# Patient Record
Sex: MALE | Race: WHITE | Employment: FULL TIME | URBAN - METROPOLITAN AREA
[De-identification: names, ages, dates, MRNs, and addresses within clinical notes are randomized per-mention and may not be internally consistent; named-entity substitution may affect disease eponyms.]

---

## 2017-01-23 ENCOUNTER — OFFICE VISIT (OUTPATIENT)
Dept: FAMILY MEDICINE CLINIC | Facility: CLINIC | Age: 62
End: 2017-01-23

## 2017-01-23 VITALS
RESPIRATION RATE: 18 BRPM | HEIGHT: 66 IN | DIASTOLIC BLOOD PRESSURE: 86 MMHG | SYSTOLIC BLOOD PRESSURE: 142 MMHG | HEART RATE: 80 BPM | BODY MASS INDEX: 32.02 KG/M2 | OXYGEN SATURATION: 99 % | WEIGHT: 199.25 LBS | TEMPERATURE: 99 F

## 2017-01-23 DIAGNOSIS — G47.00 INSOMNIA, UNSPECIFIED TYPE: ICD-10-CM

## 2017-01-23 DIAGNOSIS — G89.29 CHRONIC RIGHT SHOULDER PAIN: Primary | ICD-10-CM

## 2017-01-23 DIAGNOSIS — M25.511 CHRONIC RIGHT SHOULDER PAIN: Primary | ICD-10-CM

## 2017-01-23 DIAGNOSIS — M24.111: ICD-10-CM

## 2017-01-23 DIAGNOSIS — I10 ESSENTIAL HYPERTENSION, BENIGN: ICD-10-CM

## 2017-01-23 PROCEDURE — 99213 OFFICE O/P EST LOW 20 MIN: CPT | Performed by: FAMILY MEDICINE

## 2017-01-23 NOTE — PROGRESS NOTES
Arnulfo Hernandez is a 57year old male.   Patient presents with:  Shoulder Pain: Go over shoulder pain issues  Referral    HPI:   Patient complaining of chronic right shoulder pain and pain in the right scapular area, states was in an accident a few years right, no tenderness to palpation along the joint line, + scapular grinding and popping with ROM, full ROM but has discomfort with shoulder extension, crossarm and abduction, negative impingement.   EXTREMITIES: no cyanosis, clubbing or edema    ASSESSMENT

## 2017-01-23 NOTE — PATIENT INSTRUCTIONS
Please restart amlodipine 1/2 tablet daily. Monitor blood pressure at home. Please take Melatonin 4mg and increase by 1 mg as needed, take 1/2 hour before bedtime, if insomnia is not getting better in 1 week, please follow up.     Controlling High Blood · When you can, walk or bike instead of driving. · [place] leaves, garden, or do household repairs.   · Be active at a moderate to vigorous level of physical activity for at least 40 minutes for a minimum of 3 to 4 days a week.   Manage stress  · Make time to · Exercise regularly. It may help you reduce stress. Avoid strenuous exercise for 2 to 4 hours before bedtime. · Avoid or limit naps, especially in the late afternoon. · Use your bed only for sleep and sex.   · Don’t spend too much time in bed trying to f

## 2017-03-22 ENCOUNTER — TELEPHONE (OUTPATIENT)
Dept: FAMILY MEDICINE CLINIC | Facility: CLINIC | Age: 62
End: 2017-03-22

## 2017-03-22 DIAGNOSIS — M25.511 CHRONIC RIGHT SHOULDER PAIN: ICD-10-CM

## 2017-03-22 DIAGNOSIS — M24.111: ICD-10-CM

## 2017-03-22 DIAGNOSIS — G89.29 CHRONIC RIGHT SHOULDER PAIN: ICD-10-CM

## 2017-03-22 NOTE — TELEPHONE ENCOUNTER
Patient states Dr Dago Garcia referred him to PT back in January. He called to schedule and they say the referral is outdated. He needs an updated referral.  Please notify patient when updated.

## 2017-03-22 NOTE — TELEPHONE ENCOUNTER
The hospital only allows for 30 days to pass without an initial appt    Ok for new referral or needs repeat evaluation

## 2017-04-07 ENCOUNTER — OFFICE VISIT (OUTPATIENT)
Dept: PHYSICAL THERAPY | Age: 62
End: 2017-04-07
Attending: FAMILY MEDICINE
Payer: COMMERCIAL

## 2017-04-07 DIAGNOSIS — M24.111: ICD-10-CM

## 2017-04-07 DIAGNOSIS — G89.29 CHRONIC RIGHT SHOULDER PAIN: Primary | ICD-10-CM

## 2017-04-07 DIAGNOSIS — M25.511 CHRONIC RIGHT SHOULDER PAIN: Primary | ICD-10-CM

## 2017-04-07 PROCEDURE — 97161 PT EVAL LOW COMPLEX 20 MIN: CPT

## 2017-04-07 PROCEDURE — 97112 NEUROMUSCULAR REEDUCATION: CPT

## 2017-04-07 NOTE — PROGRESS NOTES
UPPER EXTREMITY EVALUATION:   Referring Physician: Dr. Unruly Fry  Diagnosis: Chronic right shoulder pain, snapping scapula    Date of Service: 4/7/2017     PATIENT SUMMARY   Christophe Barrera is a 57year old y/o male who presents to therapy today with co upper cervical muscles    Strength/MMT:  Shoulder Scapular   Flexion: R 5/5; L 5/5  Abduction: R 4/5; L 4/5  ER: R 4/5; L 4/5  IR: R 4/5; L 4/5 Rhomboids: R 3-/5, L 4/5  Mid trap: R 3-/5; L 4/5   Lats: R 4/5, L 4/5  Low trap: R 2/5; L 3/5     Special tests

## 2017-04-14 ENCOUNTER — OFFICE VISIT (OUTPATIENT)
Dept: PHYSICAL THERAPY | Age: 62
End: 2017-04-14
Attending: FAMILY MEDICINE
Payer: COMMERCIAL

## 2017-04-14 PROCEDURE — 97112 NEUROMUSCULAR REEDUCATION: CPT

## 2017-04-14 PROCEDURE — 97110 THERAPEUTIC EXERCISES: CPT

## 2017-04-14 NOTE — PROGRESS NOTES
Dx: Chronic right shoulder pain, snapping scapula        Authorized # of Visits:  8         Next MD visit: none scheduled  Fall Risk: standard         Precautions: n/a             Subjective: No significant change this week, exercises are fine.     Objectiv

## 2017-04-21 ENCOUNTER — OFFICE VISIT (OUTPATIENT)
Dept: PHYSICAL THERAPY | Age: 62
End: 2017-04-21
Attending: FAMILY MEDICINE
Payer: COMMERCIAL

## 2017-04-21 PROCEDURE — 97112 NEUROMUSCULAR REEDUCATION: CPT

## 2017-04-21 PROCEDURE — 97110 THERAPEUTIC EXERCISES: CPT

## 2017-04-21 NOTE — PROGRESS NOTES
Dx: Chronic right shoulder pain, snapping scapula        Authorized # of Visits:  8         Next MD visit: none scheduled  Fall Risk: standard         Precautions: n/a             Subjective: No significant change this week, exercises are fine.     Objectiv each        Seated pt education on correct posture 5 mins Supine passive stretch R shoulder flex, abd, add, ER, IR 5 mins        Supine cervical distraction, sub-occipital stretch 2 mins Standing green t band rows high and middle anchor point x 20 each

## 2017-04-28 ENCOUNTER — APPOINTMENT (OUTPATIENT)
Dept: PHYSICAL THERAPY | Age: 62
End: 2017-04-28
Attending: FAMILY MEDICINE
Payer: COMMERCIAL

## 2017-05-05 ENCOUNTER — APPOINTMENT (OUTPATIENT)
Dept: PHYSICAL THERAPY | Age: 62
End: 2017-05-05
Attending: FAMILY MEDICINE
Payer: COMMERCIAL

## 2017-05-12 ENCOUNTER — OFFICE VISIT (OUTPATIENT)
Dept: PHYSICAL THERAPY | Age: 62
End: 2017-05-12
Attending: FAMILY MEDICINE
Payer: COMMERCIAL

## 2017-05-12 PROCEDURE — 97112 NEUROMUSCULAR REEDUCATION: CPT

## 2017-05-12 PROCEDURE — 97140 MANUAL THERAPY 1/> REGIONS: CPT

## 2017-05-12 PROCEDURE — 97110 THERAPEUTIC EXERCISES: CPT

## 2017-05-12 NOTE — PROGRESS NOTES
Dx: Chronic right shoulder pain, snapping scapula        Authorized # of Visits:  8         Next MD visit: none scheduled  Fall Risk: standard         Precautions: n/a             Subjective: Pain in R scapula ranges from 0-4/10.  Pain R upper arm ranges fr Supine serratus press with 2# wts R x 20  R sh flexion x 20     Supine serratus press R x 20 Supine serratus press R with 2# wt 2 x 10 with PT assist for correct technique Supine GH joint mobs Gd 3 2 x 30 secs post glide, inf glide     Prone scap squeeze x

## 2017-05-19 ENCOUNTER — APPOINTMENT (OUTPATIENT)
Dept: PHYSICAL THERAPY | Age: 62
End: 2017-05-19
Attending: FAMILY MEDICINE
Payer: COMMERCIAL

## 2017-05-26 ENCOUNTER — OFFICE VISIT (OUTPATIENT)
Dept: PHYSICAL THERAPY | Age: 62
End: 2017-05-26
Attending: FAMILY MEDICINE
Payer: COMMERCIAL

## 2017-05-26 PROCEDURE — 97140 MANUAL THERAPY 1/> REGIONS: CPT

## 2017-05-26 PROCEDURE — 97110 THERAPEUTIC EXERCISES: CPT

## 2017-05-26 NOTE — PROGRESS NOTES
Dx: Chronic right shoulder pain, snapping scapula        Authorized # of Visits:  8         Next MD visit: none scheduled  Fall Risk: standard         Precautions: n/a             Subjective: Pain in R scapula ranges from 0-4/10.                       Pain 30 sec hold x 3 each   Side lying with PT assist for correct motion R scapular elevation/depression,protraction/retraction Supine DNF strengthening head nod with lift 5 sec hold x 10 Supine serratus press with 2# wts R x 20  R sh flexion x 20 Review of HEP

## 2017-06-02 ENCOUNTER — APPOINTMENT (OUTPATIENT)
Dept: PHYSICAL THERAPY | Age: 62
End: 2017-06-02
Attending: FAMILY MEDICINE
Payer: COMMERCIAL

## 2017-06-09 ENCOUNTER — APPOINTMENT (OUTPATIENT)
Dept: PHYSICAL THERAPY | Age: 62
End: 2017-06-09
Attending: FAMILY MEDICINE
Payer: COMMERCIAL

## 2018-08-24 ENCOUNTER — TELEPHONE (OUTPATIENT)
Dept: FAMILY MEDICINE CLINIC | Facility: CLINIC | Age: 63
End: 2018-08-24

## 2018-10-01 ENCOUNTER — PATIENT MESSAGE (OUTPATIENT)
Dept: FAMILY MEDICINE CLINIC | Facility: CLINIC | Age: 63
End: 2018-10-01

## 2018-10-01 ENCOUNTER — OFFICE VISIT (OUTPATIENT)
Dept: FAMILY MEDICINE CLINIC | Facility: CLINIC | Age: 63
End: 2018-10-01
Payer: COMMERCIAL

## 2018-10-01 VITALS
OXYGEN SATURATION: 97 % | HEART RATE: 82 BPM | WEIGHT: 194 LBS | BODY MASS INDEX: 31.18 KG/M2 | DIASTOLIC BLOOD PRESSURE: 80 MMHG | SYSTOLIC BLOOD PRESSURE: 132 MMHG | TEMPERATURE: 98 F | HEIGHT: 66 IN | RESPIRATION RATE: 16 BRPM

## 2018-10-01 DIAGNOSIS — Z23 NEED FOR VACCINATION: ICD-10-CM

## 2018-10-01 DIAGNOSIS — R01.1 HEART MURMUR: ICD-10-CM

## 2018-10-01 DIAGNOSIS — Z12.5 SCREENING FOR PROSTATE CANCER: ICD-10-CM

## 2018-10-01 DIAGNOSIS — Z00.00 ROUTINE GENERAL MEDICAL EXAMINATION AT A HEALTH CARE FACILITY: Primary | ICD-10-CM

## 2018-10-01 PROCEDURE — 90471 IMMUNIZATION ADMIN: CPT | Performed by: FAMILY MEDICINE

## 2018-10-01 PROCEDURE — 90686 IIV4 VACC NO PRSV 0.5 ML IM: CPT | Performed by: FAMILY MEDICINE

## 2018-10-01 PROCEDURE — 99396 PREV VISIT EST AGE 40-64: CPT | Performed by: FAMILY MEDICINE

## 2018-10-01 NOTE — TELEPHONE ENCOUNTER
Patient also left voicemail with this question, called him back and informed him fasting is only 12 hours. He verbalized understanding.

## 2018-10-01 NOTE — PROGRESS NOTES
Glory Manzo is a 58year old male here for Patient presents with: Well Adult: Physical.    HPI:   Patient is sen for annual physical  Colonoscopy is up to date and has to repeat next year as he had a colon polyp.   Has no complaints or concerns this Comment: 2 times per month      Sexual activity: Yes        Partners: Female      Social History Narrative      Likes sweets and salty snacks , eats fast food at work, eats healthier at home    REVIEW OF SYSTEMS:   Constitutional: no change in weight or ap atraumatic   Eyes:    PERRL, conjunctiva/corneas clear, EOM's intact        Ears:    normal   Nose:  normal   Throat:   Lips, mucosa, and tongue normal; teeth and gums normal   Neck:   Supple, symmetrical, trachea midline, no adenopathy;        thyroid:  N

## 2019-04-27 PROBLEM — D12.3 BENIGN NEOPLASM OF TRANSVERSE COLON: Status: ACTIVE | Noted: 2019-04-27

## 2019-04-27 PROBLEM — Z86.0100 PERSONAL HISTORY OF COLONIC POLYPS: Status: ACTIVE | Noted: 2019-04-27

## 2019-04-27 PROBLEM — Z86.010 PERSONAL HISTORY OF COLONIC POLYPS: Status: ACTIVE | Noted: 2019-04-27

## 2019-07-19 ENCOUNTER — PATIENT MESSAGE (OUTPATIENT)
Dept: FAMILY MEDICINE CLINIC | Facility: CLINIC | Age: 64
End: 2019-07-19

## 2019-07-19 NOTE — TELEPHONE ENCOUNTER
From: Delfina Montenegro  To: Hernesto Trejo MD  Sent: 7/19/2019 2:26 PM CDT  Subject: Other    Hi there,     One other question: My new insurance enrollment form is asking for my \"Primary Care Office ID Number\". I'm assuming that's a real thing?

## 2019-08-16 ENCOUNTER — OFFICE VISIT (OUTPATIENT)
Dept: FAMILY MEDICINE CLINIC | Facility: CLINIC | Age: 64
End: 2019-08-16
Payer: COMMERCIAL

## 2019-08-16 VITALS
DIASTOLIC BLOOD PRESSURE: 98 MMHG | HEIGHT: 66 IN | OXYGEN SATURATION: 98 % | BODY MASS INDEX: 31.72 KG/M2 | SYSTOLIC BLOOD PRESSURE: 144 MMHG | HEART RATE: 72 BPM | TEMPERATURE: 98 F | RESPIRATION RATE: 20 BRPM | WEIGHT: 197.38 LBS

## 2019-08-16 DIAGNOSIS — Z12.5 SCREENING FOR PROSTATE CANCER: ICD-10-CM

## 2019-08-16 DIAGNOSIS — I10 ESSENTIAL HYPERTENSION, BENIGN: ICD-10-CM

## 2019-08-16 DIAGNOSIS — Z00.00 ROUTINE GENERAL MEDICAL EXAMINATION AT A HEALTH CARE FACILITY: Primary | ICD-10-CM

## 2019-08-16 DIAGNOSIS — Z23 NEED FOR VACCINATION: ICD-10-CM

## 2019-08-16 DIAGNOSIS — Z13.1 SCREENING FOR DIABETES MELLITUS: ICD-10-CM

## 2019-08-16 DIAGNOSIS — E66.9 OBESITY (BMI 30.0-34.9): ICD-10-CM

## 2019-08-16 PROBLEM — E66.811 OBESITY (BMI 30.0-34.9): Status: ACTIVE | Noted: 2019-08-16

## 2019-08-16 PROCEDURE — 90670 PCV13 VACCINE IM: CPT | Performed by: FAMILY MEDICINE

## 2019-08-16 PROCEDURE — 90471 IMMUNIZATION ADMIN: CPT | Performed by: FAMILY MEDICINE

## 2019-08-16 PROCEDURE — 99397 PER PM REEVAL EST PAT 65+ YR: CPT | Performed by: FAMILY MEDICINE

## 2019-08-16 PROCEDURE — 99213 OFFICE O/P EST LOW 20 MIN: CPT | Performed by: FAMILY MEDICINE

## 2019-08-16 RX ORDER — LOSARTAN POTASSIUM 25 MG/1
TABLET ORAL
Qty: 90 TABLET | Refills: 0 | OUTPATIENT
Start: 2019-08-16

## 2019-08-16 RX ORDER — LOSARTAN POTASSIUM 25 MG/1
25 TABLET ORAL DAILY
Qty: 30 TABLET | Refills: 0 | Status: SHIPPED | OUTPATIENT
Start: 2019-08-16 | End: 2019-09-20

## 2019-08-16 RX ORDER — LATANOPROST 50 UG/ML
0.01 SOLUTION/ DROPS OPHTHALMIC NIGHTLY
Refills: 5 | COMMUNITY
Start: 2019-06-19

## 2019-08-16 NOTE — PATIENT INSTRUCTIONS
Controlling High Blood Pressure  High blood pressure (hypertension) is often called the silent killer. This is because many people who have it don’t know it. High blood pressure can raise your risk of heart attack, stroke, and heart failure.  Controlling yo provider what weight range is healthiest for you. If you are overweight, a weight loss of only 3% to 5% of your body weight can help lower blood pressure. Generally, a good weight loss goal is to lose 10% of your body weight in a year.   · Limit snacks and vaccines are an important part of managing your health. A screening test is done to find possible disorders or diseases in people who don't have any symptoms.  The goal is to find a disease early so lifestyle changes can be made and you can be watched more c cholesterol or triglycerides All men in this age group At least every 5 years   HIV Men at increased risk for infection – talk with your healthcare provider At routine exams   Lung cancer Adults ages 54 to [de-identified] who have smoked Yearly screening in smokers wit 1 dose of each vaccine   Tetanus/diphtheria/  pertussis (Td/Tdap) booster All men in this age group Td every 10 years, or Tdap if you will have contact with a child younger than 13 months old   Zoster All men in this age group 1 dose   Counseling Who needs

## 2019-08-16 NOTE — PROGRESS NOTES
Allen Gómez is a 71year old male here for Patient presents with: Well Adult: Physical.    HPI:   Patient is sen for annual physical  Colonoscopy is up to date and has to repeat in 3 years as he had mor polyps  Needs biometric form filled for work. Cancer Maternal Grandfather         He was 80   • Heart Attack Brother      SOCIAL HISTORY:     Social History    Socioeconomic History      Marital status:       Spouse name: Not on file      Number of children: 2      Occupational History      Oc difficulty with urine stream.  Rheumatologic: no joint pain, swelling, stiffness. No myalgias. No history of autoimmune disorder. Derm/Skin: No rash or atypical skin lesions.   Neuro: No headache, transient vision disturbances, tremor, difficulty with coor health care facility  -     CBC WITH DIFFERENTIAL WITH PLATELET; Future  -     COMP METABOLIC PANEL (14); Future  -     LIPID PANEL;  Future  -     TSH W REFLEX TO FREE T4; Future  -     URINALYSIS, ROUTINE; Future  -     CBC WITH DIFFERENTIAL WITH PLATELET

## 2019-08-16 NOTE — TELEPHONE ENCOUNTER
Name from pharmacy: LOSARTAN 25MG TABLETS          Will file in chart as: LOSARTAN POTASSIUM 25 MG Oral Tab    Sig: TAKE 1 TABLET(25 MG) BY MOUTH DAILY    Disp:  90 tablet    Refills:  0    Start: 8/16/2019    Class: Normal    For: Essential hypertension,

## 2019-08-19 ENCOUNTER — PATIENT MESSAGE (OUTPATIENT)
Dept: FAMILY MEDICINE CLINIC | Facility: CLINIC | Age: 64
End: 2019-08-19

## 2019-08-19 NOTE — TELEPHONE ENCOUNTER
From: Miranda Montenegro  To:  Maria Elena Knight MD  Sent: 8/19/2019 9:41 AM CDT  Subject: Non-Urgent Medical Question    Good morning,     I scheduled my lab tests at [facility] for Wednesday morning at 8:15 AM. Since I did it on-line I wasn't able to ask them

## 2019-08-22 LAB
ABSOLUTE BASOPHILS: 33 CELLS/UL (ref 0–200)
ABSOLUTE EOSINOPHILS: 91 CELLS/UL (ref 15–500)
ABSOLUTE LYMPHOCYTES: 1486 CELLS/UL (ref 850–3900)
ABSOLUTE MONOCYTES: 349 CELLS/UL (ref 200–950)
ABSOLUTE NEUTROPHILS: 6341 CELLS/UL (ref 1500–7800)
ALBUMIN/GLOBULIN RATIO: 2 (CALC) (ref 1–2.5)
ALBUMIN: 4.7 G/DL (ref 3.6–5.1)
ALKALINE PHOSPHATASE: 53 U/L (ref 40–115)
ALT: 22 U/L (ref 9–46)
APPEARANCE: CLEAR
AST: 22 U/L (ref 10–35)
BASOPHILS: 0.4 %
BILIRUBIN, TOTAL: 0.8 MG/DL (ref 0.2–1.2)
BILIRUBIN: NEGATIVE
BUN: 19 MG/DL (ref 7–25)
CALCIUM: 10 MG/DL (ref 8.6–10.3)
CARBON DIOXIDE: 28 MMOL/L (ref 20–32)
CHLORIDE: 103 MMOL/L (ref 98–110)
CHOL/HDLC RATIO: 3 (CALC)
CHOLESTEROL, TOTAL: 168 MG/DL
COLOR: YELLOW
CREATININE: 1.02 MG/DL (ref 0.7–1.25)
EGFR IF AFRICN AM: 89 ML/MIN/1.73M2
EGFR IF NONAFRICN AM: 77 ML/MIN/1.73M2
EOSINOPHILS: 1.1 %
GLOBULIN: 2.3 G/DL (CALC) (ref 1.9–3.7)
GLUCOSE: 79 MG/DL (ref 65–99)
GLUCOSE: NEGATIVE
HDL CHOLESTEROL: 56 MG/DL
HEMATOCRIT: 45.3 % (ref 38.5–50)
HEMOGLOBIN A1C: 5.2 % OF TOTAL HGB
HEMOGLOBIN: 15.5 G/DL (ref 13.2–17.1)
KETONES: NEGATIVE
LDL-CHOLESTEROL: 92 MG/DL (CALC)
LEUKOCYTE ESTERASE: NEGATIVE
LYMPHOCYTES: 17.9 %
MCH: 30.5 PG (ref 27–33)
MCHC: 34.2 G/DL (ref 32–36)
MCV: 89 FL (ref 80–100)
MONOCYTES: 4.2 %
MPV: 9.2 FL (ref 7.5–12.5)
NEUTROPHILS: 76.4 %
NITRITE: NEGATIVE
NON-HDL CHOLESTEROL: 112 MG/DL (CALC)
OCCULT BLOOD: NEGATIVE
PH: 5.5 (ref 5–8)
PLATELET COUNT: 265 THOUSAND/UL (ref 140–400)
POTASSIUM: 4.7 MMOL/L (ref 3.5–5.3)
PROTEIN, TOTAL: 7 G/DL (ref 6.1–8.1)
PROTEIN: NEGATIVE
PSA, TOTAL: 1.5 NG/ML
RDW: 12.7 % (ref 11–15)
RED BLOOD CELL COUNT: 5.09 MILLION/UL (ref 4.2–5.8)
SODIUM: 140 MMOL/L (ref 135–146)
SPECIFIC GRAVITY: 1.01 (ref 1–1.03)
TRIGLYCERIDES: 105 MG/DL
TSH W/REFLEX TO FT4: 1.53 MIU/L (ref 0.4–4.5)
WHITE BLOOD CELL COUNT: 8.3 THOUSAND/UL (ref 3.8–10.8)

## 2019-08-28 ENCOUNTER — PATIENT MESSAGE (OUTPATIENT)
Dept: FAMILY MEDICINE CLINIC | Facility: CLINIC | Age: 64
End: 2019-08-28

## 2019-08-28 NOTE — TELEPHONE ENCOUNTER
From: Raymond Montenegro  To:  Thai Le MD  Sent: 8/28/2019 8:07 AM CDT  Subject: Non-Urgent Medical Question    Good morning,    I completed all my tests for my physical. Please send in my employers confirmatiion for same (left it with you when I

## 2019-08-28 NOTE — TELEPHONE ENCOUNTER
Checked folder, no bio form for this pt    Sent [facility] message to see if he can refax to us

## 2019-08-28 NOTE — TELEPHONE ENCOUNTER
Regine kept all patient forms in a folder on her desk, if It is there patient has to  the form as we cannot fax it to his personal fax.

## 2019-09-20 ENCOUNTER — OFFICE VISIT (OUTPATIENT)
Dept: FAMILY MEDICINE CLINIC | Facility: CLINIC | Age: 64
End: 2019-09-20
Payer: COMMERCIAL

## 2019-09-20 VITALS
BODY MASS INDEX: 31.82 KG/M2 | SYSTOLIC BLOOD PRESSURE: 122 MMHG | DIASTOLIC BLOOD PRESSURE: 88 MMHG | RESPIRATION RATE: 18 BRPM | HEIGHT: 66 IN | OXYGEN SATURATION: 98 % | HEART RATE: 76 BPM | TEMPERATURE: 98 F | WEIGHT: 198 LBS

## 2019-09-20 DIAGNOSIS — I10 ESSENTIAL HYPERTENSION, BENIGN: ICD-10-CM

## 2019-09-20 PROCEDURE — 99213 OFFICE O/P EST LOW 20 MIN: CPT | Performed by: FAMILY MEDICINE

## 2019-09-20 RX ORDER — LOSARTAN POTASSIUM 25 MG/1
25 TABLET ORAL DAILY
Qty: 90 TABLET | Refills: 1 | Status: SHIPPED | OUTPATIENT
Start: 2019-09-20 | End: 2019-12-19

## 2019-09-20 NOTE — PROGRESS NOTES
Raymond Haywood is a 71year old male.   Patient presents with:  Hypertension: f/u    HPI:   HTN: Patient is seen for follow-up and medication refill, states is tolerating medication well, did forget to take his medication a couple of days but now has a denies shortness of breath with exertion  CARDIOVASCULAR: denies chest pain on exertion  GI: denies abdominal pain and denies heartburn  NEURO: denies headaches    EXAM:   /88   Pulse 76   Temp 98.4 °F (36.9 °C) (Oral)   Resp 18   Ht 66\"   Wt 198 lb

## 2019-09-20 NOTE — PATIENT INSTRUCTIONS
Controlling High Blood Pressure  High blood pressure (hypertension) is often called the silent killer. This is because many people who have it don’t know it. High blood pressure can raise your risk of heart attack, stroke, and heart failure.  Controlling yo · Ask your healthcare provider what weight range is healthiest for you. If you are overweight, a weight loss of only 3% to 5% of your body weight can help lower blood pressure.  Generally, a good weight loss goal is to lose 10% of your body weight in a year

## 2020-03-01 ENCOUNTER — HOSPITAL ENCOUNTER (OUTPATIENT)
Age: 65
Discharge: ANOTHER HOSPITAL | End: 2020-03-01
Attending: EMERGENCY MEDICINE
Payer: COMMERCIAL

## 2020-03-01 ENCOUNTER — HOSPITAL ENCOUNTER (EMERGENCY)
Facility: HOSPITAL | Age: 65
Discharge: HOME | End: 2020-03-01
Attending: EMERGENCY MEDICINE
Payer: COMMERCIAL

## 2020-03-01 VITALS
OXYGEN SATURATION: 99 % | SYSTOLIC BLOOD PRESSURE: 146 MMHG | DIASTOLIC BLOOD PRESSURE: 90 MMHG | BODY MASS INDEX: 31.5 KG/M2 | TEMPERATURE: 98 F | HEART RATE: 80 BPM | WEIGHT: 196 LBS | HEIGHT: 66 IN | RESPIRATION RATE: 18 BRPM

## 2020-03-01 VITALS
HEIGHT: 66 IN | WEIGHT: 196 LBS | TEMPERATURE: 98 F | HEART RATE: 68 BPM | OXYGEN SATURATION: 100 % | DIASTOLIC BLOOD PRESSURE: 98 MMHG | SYSTOLIC BLOOD PRESSURE: 145 MMHG | RESPIRATION RATE: 14 BRPM | BODY MASS INDEX: 31.5 KG/M2

## 2020-03-01 DIAGNOSIS — R03.0 ELEVATED BLOOD PRESSURE READING: ICD-10-CM

## 2020-03-01 DIAGNOSIS — E86.0 DEHYDRATION: ICD-10-CM

## 2020-03-01 DIAGNOSIS — R42 DIZZINESS: Primary | ICD-10-CM

## 2020-03-01 LAB
ATRIAL RATE: 77 BPM
GLUCOSE BLD-MCNC: 101 MG/DL (ref 70–99)
P AXIS: 33 DEGREES
P-R INTERVAL: 170 MS
POCT INFLUENZA A: NEGATIVE
POCT INFLUENZA B: NEGATIVE
Q-T INTERVAL: 396 MS
QRS DURATION: 76 MS
QTC CALCULATION (BEZET): 448 MS
R AXIS: 50 DEGREES
T AXIS: 49 DEGREES
VENTRICULAR RATE: 77 BPM

## 2020-03-01 PROCEDURE — 93010 ELECTROCARDIOGRAM REPORT: CPT | Performed by: INTERNAL MEDICINE

## 2020-03-01 PROCEDURE — 93005 ELECTROCARDIOGRAM TRACING: CPT

## 2020-03-01 PROCEDURE — 99282 EMERGENCY DEPT VISIT SF MDM: CPT

## 2020-03-01 PROCEDURE — 87502 INFLUENZA DNA AMP PROBE: CPT | Performed by: EMERGENCY MEDICINE

## 2020-03-01 PROCEDURE — 93010 ELECTROCARDIOGRAM REPORT: CPT

## 2020-03-01 PROCEDURE — 82962 GLUCOSE BLOOD TEST: CPT

## 2020-03-01 PROCEDURE — 99214 OFFICE O/P EST MOD 30 MIN: CPT

## 2020-03-01 RX ORDER — LOSARTAN POTASSIUM 25 MG/1
TABLET ORAL DAILY
COMMUNITY
End: 2020-03-18

## 2020-03-01 NOTE — ED PROVIDER NOTES
Patient Seen in: [facility]      History   Patient presents with:  Dizziness    Stated Complaint: dizzyness and high bp  x 2 days     HPI    This is a 14-year-old male who presents with complaints of dizziness for the last Smokeless tobacco: Never Used      Tobacco comment: quit in 1986    Alcohol use:  Yes      Alcohol/week: 3.0 standard drinks      Types: 2 Cans of beer, 1 Shots of liquor per week      Comment: Occasional glass of wine    Drug use: Yes      Frequency: 1.0 t Course     Labs Reviewed   POCT GLUCOSE - Abnormal; Notable for the following components:       Result Value    POC Glucose 101 (*)     All other components within normal limits   POCT FLU TEST - Normal    Narrative:      This assay is a rapid molecular in

## 2020-03-01 NOTE — ED INITIAL ASSESSMENT (HPI)
Pt c/o dizziness x 2 days. Body aches x 5-6 days, diarrhea- watery x 2 days - now resolved. Pt states he does feel anxious. Pt returned from a 4 days trip to [place] 1 week ago.  Denies cough,vomiting, sore throat, nasal congestion, runny nose or known fever

## 2020-03-01 NOTE — ED INITIAL ASSESSMENT (HPI)
Pt sent to ED from immediate care for evaluation of feeling dizzy and lightheaded. Pt reports feeling generally not well all last week. Pt reports feeling lightheaded last night, woke up this morning with increased symptoms and feeling anxious.

## 2020-03-18 RX ORDER — LOSARTAN POTASSIUM 25 MG/1
TABLET ORAL
Qty: 30 TABLET | Refills: 0 | Status: SHIPPED | OUTPATIENT
Start: 2020-03-18 | End: 2020-04-02 | Stop reason: DRUGHIGH

## 2020-03-18 RX ORDER — LOSARTAN POTASSIUM 25 MG/1
TABLET ORAL
Qty: 90 TABLET | Refills: 0 | OUTPATIENT
Start: 2020-03-18

## 2020-03-18 NOTE — TELEPHONE ENCOUNTER
LOV 9/19 Return in about 6 months (around 3/20/2020)      LAST LAB 8/19    LAST RX   LOSARTAN 25MG TABLETS 12/20/2019 09/20/2019  90 each          Next OV 4/17/2020      PROTOCOL  Hypertension Medications Protocol Passed    Was in ER Disposition:  Saundra Obregon

## 2020-03-18 NOTE — TELEPHONE ENCOUNTER
LOV 9/20/2019    LAST LAB 8-12-19    LAST RX     Next OV   Future Appointments   Date Time Provider Cinda Cruz   4/17/2020  9:40 AM Camilo Messina MD [facility]         PROTOCOL    Name from pharmacy: LOSARTAN 25MG [address]

## 2020-04-01 ENCOUNTER — PATIENT MESSAGE (OUTPATIENT)
Dept: FAMILY MEDICINE CLINIC | Facility: CLINIC | Age: 65
End: 2020-04-01

## 2020-04-02 RX ORDER — LOSARTAN POTASSIUM 50 MG/1
50 TABLET ORAL DAILY
Qty: 30 TABLET | Refills: 1 | Status: SHIPPED | OUTPATIENT
Start: 2020-04-02 | End: 2020-05-05

## 2020-04-02 RX ORDER — LOSARTAN POTASSIUM 50 MG/1
TABLET ORAL
Qty: 90 TABLET | Refills: 0 | OUTPATIENT
Start: 2020-04-02

## 2020-04-02 NOTE — TELEPHONE ENCOUNTER
LOV 9/20/2019    LAST LAB 8/21/2019    LAST RX   losartan Potassium 50 MG Oral Tab 30 tablet 1 4/2/2020 5/2/2020   Sig:   Take 1 tablet (50 mg total) by mouth daily.            Next OV   Future Appointments   Date Time Provider Cinda Cruz   4/17/2020

## 2020-05-05 ENCOUNTER — TELEMEDICINE (OUTPATIENT)
Dept: FAMILY MEDICINE CLINIC | Facility: CLINIC | Age: 65
End: 2020-05-05

## 2020-05-05 VITALS
DIASTOLIC BLOOD PRESSURE: 85 MMHG | BODY MASS INDEX: 31 KG/M2 | SYSTOLIC BLOOD PRESSURE: 131 MMHG | TEMPERATURE: 98 F | WEIGHT: 192 LBS

## 2020-05-05 DIAGNOSIS — I10 ESSENTIAL HYPERTENSION, BENIGN: Primary | ICD-10-CM

## 2020-05-05 PROCEDURE — 99213 OFFICE O/P EST LOW 20 MIN: CPT | Performed by: FAMILY MEDICINE

## 2020-05-05 RX ORDER — LOSARTAN POTASSIUM 50 MG/1
50 TABLET ORAL DAILY
Qty: 90 TABLET | Refills: 0 | Status: SHIPPED | OUTPATIENT
Start: 2020-05-05 | End: 2020-08-06

## 2020-05-05 NOTE — PROGRESS NOTES
Glory Manzo is a 76year old male. Patient presents with:  Hypertension: medication f/u    HPI:   Glory Manzo is a 76year old male with history of hypertension see via video visit for follow up and medication refill.  Patient states is retir pain on exertion  GI: denies abdominal pain and denies heartburn  NEURO: denies headaches    EXAM:   /85   Temp 97.6 °F (36.4 °C) (Oral)   Wt 192 lb (87.1 kg)   BMI 30.99 kg/m²   GENERAL: well developed, well nourished,in no apparent distress  NECK:

## 2020-08-05 ENCOUNTER — PATIENT MESSAGE (OUTPATIENT)
Dept: FAMILY MEDICINE CLINIC | Facility: CLINIC | Age: 65
End: 2020-08-05

## 2020-08-05 DIAGNOSIS — I10 ESSENTIAL HYPERTENSION, BENIGN: ICD-10-CM

## 2020-08-06 ENCOUNTER — PATIENT MESSAGE (OUTPATIENT)
Dept: FAMILY MEDICINE CLINIC | Facility: CLINIC | Age: 65
End: 2020-08-06

## 2020-08-06 RX ORDER — LOSARTAN POTASSIUM 50 MG/1
50 TABLET ORAL DAILY
Qty: 90 TABLET | Refills: 0 | Status: SHIPPED | OUTPATIENT
Start: 2020-08-06 | End: 2020-11-04

## 2020-08-06 NOTE — TELEPHONE ENCOUNTER
From: Perri Ogden  To: Tenzin Gilmore MD  Sent: 8/5/2020 8:46 AM CDT  Subject: Prescription Question    Hi. My Losartan prescription is going to run out in 5 days and as I recall it is not refillable.  I had a video visit in May but if I'm require

## 2020-08-06 NOTE — TELEPHONE ENCOUNTER
LOV: 20  Phone visit 20  Last refill:  losartan Potassium 50 MG Oral Tab () 90 tablet 0 2020 8/3/2020   Sig:   Take 1 tablet (50 mg total) by mouth daily. Last labs: 19  Future appt: None    rx pended for your approval if ok.

## 2020-08-06 NOTE — TELEPHONE ENCOUNTER
Refilled #90, please schedule a 3 month follow up appointment, patient needs to recheck labs before his appointment.

## 2020-08-06 NOTE — TELEPHONE ENCOUNTER
From: Jere Orozco  To: Matilde Funk MD  Sent: 8/6/2020 6:30 AM CDT  Subject: Non-Urgent Medical Question    Good morning.  Posted this yesterday morning as a \"prescription question\" but didn't hear back so I thought I'd try it again as a non-u

## 2020-08-20 ENCOUNTER — TELEMEDICINE (OUTPATIENT)
Dept: FAMILY MEDICINE CLINIC | Facility: CLINIC | Age: 65
End: 2020-08-20

## 2020-08-20 DIAGNOSIS — Z20.822 CLOSE EXPOSURE TO COVID-19 VIRUS: Primary | ICD-10-CM

## 2020-08-20 PROCEDURE — 99213 OFFICE O/P EST LOW 20 MIN: CPT | Performed by: FAMILY MEDICINE

## 2020-08-20 NOTE — PROGRESS NOTES
Allen Gómez is a 76year old male. No chief complaint on file. This visit is conducted using telemedicine with live interactive video and audio. Moni Carlton verbally consents to virtual video visit.    Patient understands and accepts [address] glass of wine    Drug use: Yes      Frequency: 1.0 times per week      Types: Cannabis      Comment: Occasional marijuana       REVIEW OF SYSTEMS:   GENERAL HEALTH: feels well otherwise  SKIN: denies any unusual skin lesions or rashes  RESPIRATORY: denies

## 2020-08-20 NOTE — PATIENT INSTRUCTIONS
Coronavirus Disease 2019 (COVID-19)     [facility] [address] is committed to the safety and well-being of our patients, members, employees, and communities.  As concerns arise about the new strain of coronavirus that causes COVID-19, [facility] [address] your household, like dishes, towels, and bedding   10. Clean all surfaces that are touched often, like counters, tabletops, and doorknobs. Use household cleaning sprays or wipes according to the label instructions.       Patients with confirmed COVID-19 ioana symptoms started, OR until 72 hours after your fever is gone and symptoms are getting better, whichever is longer.     Additional Information      You can also get more information at the following websites:   Centers for Disease Control & Prevention (CDC)

## 2020-09-08 ENCOUNTER — OFFICE VISIT (OUTPATIENT)
Dept: FAMILY MEDICINE CLINIC | Facility: CLINIC | Age: 65
End: 2020-09-08
Payer: MEDICARE

## 2020-09-08 ENCOUNTER — PATIENT MESSAGE (OUTPATIENT)
Dept: FAMILY MEDICINE CLINIC | Facility: CLINIC | Age: 65
End: 2020-09-08

## 2020-09-08 VITALS
HEART RATE: 81 BPM | DIASTOLIC BLOOD PRESSURE: 84 MMHG | SYSTOLIC BLOOD PRESSURE: 130 MMHG | BODY MASS INDEX: 28.49 KG/M2 | RESPIRATION RATE: 18 BRPM | TEMPERATURE: 98 F | OXYGEN SATURATION: 98 % | WEIGHT: 188 LBS | HEIGHT: 68 IN

## 2020-09-08 DIAGNOSIS — Z23 NEED FOR VACCINATION: ICD-10-CM

## 2020-09-08 DIAGNOSIS — I10 ESSENTIAL HYPERTENSION, BENIGN: ICD-10-CM

## 2020-09-08 DIAGNOSIS — Z13.6 SCREENING FOR CARDIOVASCULAR CONDITION: ICD-10-CM

## 2020-09-08 DIAGNOSIS — Z00.00 ENCOUNTER FOR ANNUAL HEALTH EXAMINATION: Primary | ICD-10-CM

## 2020-09-08 DIAGNOSIS — Z23 FLU VACCINE NEED: ICD-10-CM

## 2020-09-08 DIAGNOSIS — Z12.5 SCREENING FOR PROSTATE CANCER: ICD-10-CM

## 2020-09-08 PROCEDURE — G0102 PROSTATE CA SCREENING; DRE: HCPCS | Performed by: FAMILY MEDICINE

## 2020-09-08 PROCEDURE — G0009 ADMIN PNEUMOCOCCAL VACCINE: HCPCS | Performed by: FAMILY MEDICINE

## 2020-09-08 PROCEDURE — 90732 PPSV23 VACC 2 YRS+ SUBQ/IM: CPT | Performed by: FAMILY MEDICINE

## 2020-09-08 PROCEDURE — G0402 INITIAL PREVENTIVE EXAM: HCPCS | Performed by: FAMILY MEDICINE

## 2020-09-08 PROCEDURE — 90662 IIV NO PRSV INCREASED AG IM: CPT | Performed by: FAMILY MEDICINE

## 2020-09-08 PROCEDURE — G0008 ADMIN INFLUENZA VIRUS VAC: HCPCS | Performed by: FAMILY MEDICINE

## 2020-09-08 NOTE — PATIENT INSTRUCTIONS
Delfina Montenegro's SCREENING SCHEDULE   Tests on this list are recommended by your physician but may not be covered, or covered at this frequency, by your insurer. Please check with your insurance carrier before scheduling to verify coverage.     PREVEN Screen   Covered every 10 years- more often if abnormal Colonoscopy due on 04/27/2022 Update Health Maintenance if applicable    Flex Sigmoidoscopy Screen  Covered every 5 years No results found for this or any previous visit. No flowsheet data found. TOXOIDS AND ACELLULAR PERTUSIS VACCINE (TDAP), >7 YEARS, IM USE    This may be covered with your prescription benefits, but Medicare does not cover unless Medically needed    Zoster (Not covered by Medicare Part B) No orders found for this or any previous

## 2020-09-09 NOTE — TELEPHONE ENCOUNTER
From: Allen Gómez  To: Nancy Hwang MD  Sent: 9/8/2020 11:20 AM CDT  Subject: Non-Urgent Medical Question    Hi there. Just scheduled my labs online. I believe I have to fast prior. ..right? 12 hours?

## 2020-09-10 ENCOUNTER — LAB ENCOUNTER (OUTPATIENT)
Dept: LAB | Age: 65
End: 2020-09-10
Attending: FAMILY MEDICINE
Payer: MEDICARE

## 2020-09-10 DIAGNOSIS — I10 ESSENTIAL HYPERTENSION, BENIGN: ICD-10-CM

## 2020-09-10 DIAGNOSIS — Z12.5 SCREENING FOR PROSTATE CANCER: ICD-10-CM

## 2020-09-10 DIAGNOSIS — Z13.6 SCREENING FOR CARDIOVASCULAR CONDITION: ICD-10-CM

## 2020-09-10 LAB
ALBUMIN SERPL-MCNC: 3.8 G/DL (ref 3.4–5)
ALBUMIN/GLOB SERPL: 1.1 {RATIO} (ref 1–2)
ALP LIVER SERPL-CCNC: 61 U/L (ref 45–117)
ALT SERPL-CCNC: 29 U/L (ref 16–61)
ANION GAP SERPL CALC-SCNC: 4 MMOL/L (ref 0–18)
AST SERPL-CCNC: 19 U/L (ref 15–37)
BILIRUB SERPL-MCNC: 0.6 MG/DL (ref 0.1–2)
BUN BLD-MCNC: 16 MG/DL (ref 7–18)
BUN/CREAT SERPL: 15.7 (ref 10–20)
CALCIUM BLD-MCNC: 9 MG/DL (ref 8.5–10.1)
CHLORIDE SERPL-SCNC: 107 MMOL/L (ref 98–112)
CHOLEST SMN-MCNC: 150 MG/DL (ref ?–200)
CO2 SERPL-SCNC: 28 MMOL/L (ref 21–32)
COMPLEXED PSA SERPL-MCNC: 2.12 NG/ML (ref ?–4)
CREAT BLD-MCNC: 1.02 MG/DL (ref 0.7–1.3)
GLOBULIN PLAS-MCNC: 3.5 G/DL (ref 2.8–4.4)
GLUCOSE BLD-MCNC: 87 MG/DL (ref 70–99)
HDLC SERPL-MCNC: 60 MG/DL (ref 40–59)
LDLC SERPL CALC-MCNC: 75 MG/DL (ref ?–100)
M PROTEIN MFR SERPL ELPH: 7.3 G/DL (ref 6.4–8.2)
NONHDLC SERPL-MCNC: 90 MG/DL (ref ?–130)
OSMOLALITY SERPL CALC.SUM OF ELEC: 289 MOSM/KG (ref 275–295)
PATIENT FASTING Y/N/NP: YES
PATIENT FASTING Y/N/NP: YES
POTASSIUM SERPL-SCNC: 4 MMOL/L (ref 3.5–5.1)
SODIUM SERPL-SCNC: 139 MMOL/L (ref 136–145)
TRIGL SERPL-MCNC: 74 MG/DL (ref 30–149)
VLDLC SERPL CALC-MCNC: 15 MG/DL (ref 0–30)

## 2020-09-10 PROCEDURE — 80061 LIPID PANEL: CPT

## 2020-09-10 PROCEDURE — 36415 COLL VENOUS BLD VENIPUNCTURE: CPT

## 2020-09-10 PROCEDURE — 80053 COMPREHEN METABOLIC PANEL: CPT

## 2020-09-17 ENCOUNTER — PATIENT MESSAGE (OUTPATIENT)
Dept: FAMILY MEDICINE CLINIC | Facility: CLINIC | Age: 65
End: 2020-09-17

## 2020-09-17 NOTE — TELEPHONE ENCOUNTER
From: Saúl Police  To: Luís Vargas MD  Sent: 9/17/2020 11:00 AM CDT  Subject: Non-Urgent Medical Question    Hi,    At my physical last week I did mention to Dr. Cortes Smith that lately I sometimes get dizzy if I stand up too fast...mostly from a

## 2020-12-09 ENCOUNTER — PATIENT MESSAGE (OUTPATIENT)
Dept: FAMILY MEDICINE CLINIC | Facility: CLINIC | Age: 65
End: 2020-12-09

## 2020-12-09 NOTE — TELEPHONE ENCOUNTER
From: Raymond Haywood  To: Ryder Gamble MD  Sent: 12/9/2020 1:34 PM CST  Subject: Non-Urgent Medical Question    Hi there,     My prescription for Losartan is about 10 days from running out.  It would have run out sooner, but the prescription was fo

## 2020-12-10 RX ORDER — LOSARTAN POTASSIUM 25 MG/1
25 TABLET ORAL DAILY
Qty: 90 TABLET | Refills: 0 | Status: SHIPPED | OUTPATIENT
Start: 2020-12-10 | End: 2021-03-12

## 2021-03-12 ENCOUNTER — OFFICE VISIT (OUTPATIENT)
Dept: FAMILY MEDICINE CLINIC | Facility: CLINIC | Age: 66
End: 2021-03-12
Payer: MEDICARE

## 2021-03-12 VITALS
WEIGHT: 186 LBS | HEART RATE: 93 BPM | RESPIRATION RATE: 18 BRPM | DIASTOLIC BLOOD PRESSURE: 94 MMHG | HEIGHT: 68 IN | OXYGEN SATURATION: 98 % | TEMPERATURE: 98 F | BODY MASS INDEX: 28.19 KG/M2 | SYSTOLIC BLOOD PRESSURE: 138 MMHG

## 2021-03-12 DIAGNOSIS — I10 ESSENTIAL HYPERTENSION, BENIGN: Primary | ICD-10-CM

## 2021-03-12 DIAGNOSIS — R01.1 HEART MURMUR: ICD-10-CM

## 2021-03-12 PROBLEM — E66.9 OBESITY (BMI 30.0-34.9): Status: RESOLVED | Noted: 2019-08-16 | Resolved: 2021-03-12

## 2021-03-12 PROBLEM — E66.811 OBESITY (BMI 30.0-34.9): Status: RESOLVED | Noted: 2019-08-16 | Resolved: 2021-03-12

## 2021-03-12 PROCEDURE — 99213 OFFICE O/P EST LOW 20 MIN: CPT | Performed by: FAMILY MEDICINE

## 2021-03-12 RX ORDER — LOSARTAN POTASSIUM 25 MG/1
25 TABLET ORAL DAILY
Qty: 90 TABLET | Refills: 1 | Status: SHIPPED | OUTPATIENT
Start: 2021-03-12 | End: 2021-06-10

## 2021-03-12 RX ORDER — HYDROCHLOROTHIAZIDE 12.5 MG/1
12.5 TABLET ORAL DAILY
Qty: 90 TABLET | Refills: 1 | Status: SHIPPED | OUTPATIENT
Start: 2021-03-12 | End: 2021-06-09 | Stop reason: ALTCHOICE

## 2021-03-12 NOTE — PROGRESS NOTES
Constance Godwin is a 76year old male. Patient presents with:  Hypertension    HPI:   Constance Godwin is a 76year old male with history of hypertension, seen for follow up and medication refill.  Compliant with medication and low salt diet, states B shortness of breath with exertion  CARDIOVASCULAR: denies chest pain on exertion  GI: denies abdominal pain  NEURO: denies headaches    EXAM:   BP (!) 138/94   Pulse 93   Temp 98 °F (36.7 °C) (Temporal)   Resp 18   Ht 5' 8\" (1.727 m)   Wt 186 lb (84.4 kg)

## 2021-03-12 NOTE — PATIENT INSTRUCTIONS
Controlling High Blood Pressure  High blood pressure (hypertension) is often called the silent killer. This is because many people who have it, don’t know it. It can be very dangerous.  High blood pressure can raise your risk of heart attack, stroke, hear your meal be prepared with no added salt. Stay at a healthy weight  · Ask your healthcare provider how many calories to eat a day. Then stick to that number. · Ask your healthcare provider what weight range is healthiest for you.  If you are overweight, information is not intended as a substitute for professional medical care. Always follow your healthcare professional's instructions.

## 2021-03-17 ENCOUNTER — HOSPITAL ENCOUNTER (OUTPATIENT)
Dept: CV DIAGNOSTICS | Facility: HOSPITAL | Age: 66
Discharge: HOME | End: 2021-03-17
Attending: FAMILY MEDICINE
Payer: MEDICARE

## 2021-03-17 DIAGNOSIS — R01.1 HEART MURMUR: ICD-10-CM

## 2021-03-17 PROCEDURE — 93306 TTE W/DOPPLER COMPLETE: CPT | Performed by: FAMILY MEDICINE

## 2021-04-12 ENCOUNTER — OFFICE VISIT (OUTPATIENT)
Dept: FAMILY MEDICINE CLINIC | Facility: CLINIC | Age: 66
End: 2021-04-12
Payer: MEDICARE

## 2021-04-12 VITALS
BODY MASS INDEX: 28.34 KG/M2 | SYSTOLIC BLOOD PRESSURE: 134 MMHG | HEIGHT: 68 IN | HEART RATE: 72 BPM | DIASTOLIC BLOOD PRESSURE: 84 MMHG | RESPIRATION RATE: 18 BRPM | WEIGHT: 187 LBS | OXYGEN SATURATION: 98 % | TEMPERATURE: 97 F

## 2021-04-12 DIAGNOSIS — R42 DIZZINESS: ICD-10-CM

## 2021-04-12 DIAGNOSIS — I10 ESSENTIAL HYPERTENSION, BENIGN: Primary | ICD-10-CM

## 2021-04-12 PROCEDURE — 99213 OFFICE O/P EST LOW 20 MIN: CPT | Performed by: FAMILY MEDICINE

## 2021-04-12 NOTE — PROGRESS NOTES
Ethan Bishop is a 76year old male. Patient presents with:  Hypertension    HPI:   Ethan Bishop is a 76year old male seen for 1 month follow-up after his blood pressure medication was adjusted.   Patient states initially after starting the hyd feels well otherwise  RESPIRATORY: denies shortness of breath with exertion  CARDIOVASCULAR: denies chest pain on exertion  NEURO: as per HPI    EXAM:   /84   Pulse 72   Temp 97.2 °F (36.2 °C) (Temporal)   Resp 18   Ht 5' 8\" (1.727 m)   Wt 187 lb (8

## 2021-04-12 NOTE — PATIENT INSTRUCTIONS
Controlling High Blood Pressure  High blood pressure (hypertension) is often called the silent killer. This is because many people who have it, don’t know it. It can be very dangerous.  High blood pressure can raise your risk of heart attack, stroke, hear your meal be prepared with no added salt. Stay at a healthy weight  · Ask your healthcare provider how many calories to eat a day. Then stick to that number. · Ask your healthcare provider what weight range is healthiest for you.  If you are overweight, information is not intended as a substitute for professional medical care. Always follow your healthcare professional's instructions.     Please continue to monitor your blood pressure, if your dizziness is frequent please hold the hydrochlorothiazide and s

## 2021-05-01 ENCOUNTER — PATIENT MESSAGE (OUTPATIENT)
Dept: FAMILY MEDICINE CLINIC | Facility: CLINIC | Age: 66
End: 2021-05-01

## 2021-05-03 NOTE — TELEPHONE ENCOUNTER
Per recent note:   Essential hypertension, benign well controlled  cpm     Dizziness  Courage patient to monitor his dizziness, is happening frequently to hold the HCTZ and continue to monitor his blood pressure.   Patient advised to send a [employer] message

## 2021-05-03 NOTE — TELEPHONE ENCOUNTER
From: Jemima Coello  To: Benoit Coleman MD  Sent: 5/1/2021 8:36 AM CDT  Subject: Non-Urgent Medical Question    Please inform Dr. Axel Joiner that, as we discussed at my last appointment, I continue to get very dizzy when standing up from a crouched p

## 2021-06-03 ENCOUNTER — TELEPHONE (OUTPATIENT)
Dept: FAMILY MEDICINE CLINIC | Facility: CLINIC | Age: 66
End: 2021-06-03

## 2021-06-03 ENCOUNTER — HOSPITAL ENCOUNTER (OUTPATIENT)
Age: 66
Discharge: HOME | End: 2021-06-03
Payer: MEDICARE

## 2021-06-03 VITALS
HEART RATE: 100 BPM | RESPIRATION RATE: 18 BRPM | TEMPERATURE: 98 F | SYSTOLIC BLOOD PRESSURE: 126 MMHG | DIASTOLIC BLOOD PRESSURE: 90 MMHG | OXYGEN SATURATION: 98 %

## 2021-06-03 DIAGNOSIS — R30.0 DYSURIA: Primary | ICD-10-CM

## 2021-06-03 DIAGNOSIS — R39.15 URINARY URGENCY: ICD-10-CM

## 2021-06-03 PROCEDURE — 99213 OFFICE O/P EST LOW 20 MIN: CPT | Performed by: PHYSICIAN ASSISTANT

## 2021-06-03 PROCEDURE — 81002 URINALYSIS NONAUTO W/O SCOPE: CPT | Performed by: PHYSICIAN ASSISTANT

## 2021-06-03 RX ORDER — TAMSULOSIN HYDROCHLORIDE 0.4 MG/1
0.4 CAPSULE ORAL DAILY
Qty: 7 CAPSULE | Refills: 0 | Status: SHIPPED | OUTPATIENT
Start: 2021-06-03 | End: 2021-06-09

## 2021-06-03 NOTE — ED INITIAL ASSESSMENT (HPI)
Patient who states for the past couple of days he has had some urgency and \"stinging\" with urination. States he feels like the stream has slowed down. Urine is yellow, \"normal\".   Kamila Saltness his bike for 22 miles today and admits he doesn't drink enough wate

## 2021-06-03 NOTE — TELEPHONE ENCOUNTER
Called patient who states for the past couple of days he has had some urgency and \"stinging\" with urination. States he feels like the stream has slowed down. Urine is yellow, \"normal\".   Radha Rochester his bike for 22 miles today and admits he doesn't drink enou

## 2021-06-03 NOTE — TELEPHONE ENCOUNTER
Patient left  stating that he went to IC and they did tests an prescribed tamsulosin. They instructed him to call PCP to be sure it's ok to take. Pt would like to speak with nurse.

## 2021-06-03 NOTE — ED PROVIDER NOTES
Patient Seen in: [facility] [address] [place] [address]      History   Patient presents with:  Urinary Symptoms    Stated Complaint: Urinary Concerns    HPI/Subjective:   HPI    21-year-old male presents to the immediate care for evaluation of dysuria and urina 98.4 °F (36.9 °C) (Temporal)   Resp 18   SpO2 98%         Physical Exam  Vitals and nursing note reviewed. Constitutional:       Appearance: He is well-developed. HENT:      Head: Atraumatic.       Right Ear: External ear normal.      Left Ear: External [facility]  [phone]    Schedule an appointment as soon as possible for a visit in 1 week            Medications Prescribed:  Discharge Medication List as of 6/3/2021  1:10 PM    START taking these medications    tamsulosin

## 2021-06-03 NOTE — TELEPHONE ENCOUNTER
Subject: Appointment scheduled from [place] [address]       Appointment For: Juwan Malcolm ([id number])   Visit Type: Yuliya Bruce ([id number])      6/9/2021    10:00 AM  20 mins. Gideon Rehman MD     [facility] [address] [place] [address]      Patient Comments:   Jeffrey Clinton

## 2021-06-03 NOTE — TELEPHONE ENCOUNTER
Per Dr. Brian Traore, [facility] to start Flomax and we will check symptoms at his appt next week. Patient called and notified.

## 2021-06-09 ENCOUNTER — OFFICE VISIT (OUTPATIENT)
Dept: FAMILY MEDICINE CLINIC | Facility: CLINIC | Age: 66
End: 2021-06-09
Payer: MEDICARE

## 2021-06-09 VITALS
HEART RATE: 92 BPM | SYSTOLIC BLOOD PRESSURE: 118 MMHG | TEMPERATURE: 98 F | BODY MASS INDEX: 28.79 KG/M2 | RESPIRATION RATE: 18 BRPM | WEIGHT: 190 LBS | HEIGHT: 68 IN | OXYGEN SATURATION: 100 % | DIASTOLIC BLOOD PRESSURE: 82 MMHG

## 2021-06-09 DIAGNOSIS — R39.15 URINARY URGENCY: Primary | ICD-10-CM

## 2021-06-09 PROCEDURE — 99213 OFFICE O/P EST LOW 20 MIN: CPT | Performed by: FAMILY MEDICINE

## 2021-06-09 RX ORDER — TAMSULOSIN HYDROCHLORIDE 0.4 MG/1
0.4 CAPSULE ORAL DAILY
Qty: 30 CAPSULE | Refills: 0 | Status: SHIPPED | OUTPATIENT
Start: 2021-06-09 | End: 2021-07-06

## 2021-06-09 NOTE — PROGRESS NOTES
Oscar Zavala is a 78year old male.   Patient presents with:  Urgent Care F/u    HPI:   Oscar Zavala is a 78year old male complaining that last week started experiencing some urinary urgency and discomfort with urination, went to urgent care, s otherwise  : as per HPI    EXAM:   /82   Pulse 92   Temp 98.2 °F (36.8 °C) (Temporal)   Resp 18   Ht 5' 8\" (1.727 m)   Wt 190 lb (86.2 kg)   SpO2 100%   BMI 28.89 kg/m²   GENERAL: well developed, well nourished,in no apparent distress  LUNGS: jyothi

## 2021-06-10 ENCOUNTER — PATIENT MESSAGE (OUTPATIENT)
Dept: FAMILY MEDICINE CLINIC | Facility: CLINIC | Age: 66
End: 2021-06-10

## 2021-06-10 NOTE — TELEPHONE ENCOUNTER
From: Gabby Ryan  To: Teressa Hanson MD  Sent: 6/10/2021 7:31 AM CDT  Subject: Prescription Question    Just an FYI. .. Dr. Arlen Arango referred me to Dr. Kaylah Talley and his first available appointment is currently July 27.  She also gave me a non-refillabl

## 2021-07-01 DIAGNOSIS — R39.15 URINARY URGENCY: ICD-10-CM

## 2021-07-01 NOTE — TELEPHONE ENCOUNTER
Name from pharmacy: TAMSULOSIN HCL 0.4 MG CAPSULE          Will file in chart as: tamsulosin (FLOMAX) cap    Sig: TAKE 1 CAPSULE BY MOUTH EVERY DAY    Disp:  30 capsule    Refills:  0 (Pharmacy requested: Not specified)    Start: 7/1/2021    Class: Normal

## 2021-07-06 RX ORDER — TAMSULOSIN HYDROCHLORIDE 0.4 MG/1
CAPSULE ORAL
Qty: 30 CAPSULE | Refills: 0 | Status: SHIPPED | OUTPATIENT
Start: 2021-07-06 | End: 2021-09-21 | Stop reason: ALTCHOICE

## 2021-07-13 ENCOUNTER — TELEPHONE (OUTPATIENT)
Dept: FAMILY MEDICINE CLINIC | Facility: CLINIC | Age: 66
End: 2021-07-13
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
## 2021-07-27 ENCOUNTER — OFFICE VISIT (OUTPATIENT)
Dept: SURGERY | Facility: CLINIC | Age: 66
End: 2021-07-27
Payer: MEDICARE

## 2021-07-27 DIAGNOSIS — N13.8 BPH WITH OBSTRUCTION/LOWER URINARY TRACT SYMPTOMS: Primary | ICD-10-CM

## 2021-07-27 DIAGNOSIS — Z12.5 SCREENING PSA (PROSTATE SPECIFIC ANTIGEN): ICD-10-CM

## 2021-07-27 DIAGNOSIS — N41.0 ACUTE PROSTATITIS: ICD-10-CM

## 2021-07-27 DIAGNOSIS — N40.1 BPH WITH OBSTRUCTION/LOWER URINARY TRACT SYMPTOMS: Primary | ICD-10-CM

## 2021-07-27 LAB
APPEARANCE: CLEAR
BILIRUBIN: NEGATIVE
GLUCOSE (URINE DIPSTICK): NEGATIVE MG/DL
KETONES (URINE DIPSTICK): NEGATIVE MG/DL
LEUKOCYTES: NEGATIVE
MULTISTIX LOT#: NORMAL NUMERIC
NITRITE, URINE: NEGATIVE
OCCULT BLOOD: NEGATIVE
PH, URINE: 5.5 (ref 4.5–8)
PROTEIN (URINE DIPSTICK): NEGATIVE MG/DL
SPECIFIC GRAVITY: 1.03 (ref 1–1.03)
URINE-COLOR: YELLOW
UROBILINOGEN,SEMI-QN: 0.2 MG/DL (ref 0–1.9)

## 2021-07-27 PROCEDURE — 99204 OFFICE O/P NEW MOD 45 MIN: CPT | Performed by: UROLOGY

## 2021-07-27 PROCEDURE — 81003 URINALYSIS AUTO W/O SCOPE: CPT | Performed by: UROLOGY

## 2021-07-27 RX ORDER — LOSARTAN POTASSIUM 25 MG/1
TABLET ORAL DAILY
COMMUNITY
End: 2021-09-06

## 2021-07-27 NOTE — PATIENT INSTRUCTIONS
1. Increase water intake to 40-60 ounces (2 liters) per day or enough to keep urine clear to pale yellow. 2. Cut back on dietary irritants such as coffee, tea, soda, juice. Read through dietary irritant handout. 3  Get PSA check with next set of labs.   4

## 2021-07-31 DIAGNOSIS — R39.15 URINARY URGENCY: ICD-10-CM

## 2021-08-02 RX ORDER — TAMSULOSIN HYDROCHLORIDE 0.4 MG/1
CAPSULE ORAL
Qty: 30 CAPSULE | Refills: 0 | OUTPATIENT
Start: 2021-08-02

## 2021-08-30 DIAGNOSIS — I10 ESSENTIAL (PRIMARY) HYPERTENSION: ICD-10-CM

## 2021-08-30 NOTE — TELEPHONE ENCOUNTER
LOV 21    LAST LAB 9/10/20    LAST RX   Losartan Potassium 25 MG Oral Tab () 90 tablet 1 3/12/2021 6/10/2021   Sig:   Take 1 tablet (25 mg total) by mouth daily.            Next OV   Future Appointments   Date Time Provider Cinda Marte

## 2021-09-06 RX ORDER — LOSARTAN POTASSIUM 25 MG/1
TABLET ORAL
Qty: 90 TABLET | Refills: 0 | Status: SHIPPED | OUTPATIENT
Start: 2021-09-06 | End: 2021-09-21

## 2021-09-21 ENCOUNTER — OFFICE VISIT (OUTPATIENT)
Dept: FAMILY MEDICINE CLINIC | Facility: CLINIC | Age: 66
End: 2021-09-21
Payer: MEDICARE

## 2021-09-21 VITALS
WEIGHT: 186 LBS | OXYGEN SATURATION: 98 % | BODY MASS INDEX: 28.19 KG/M2 | DIASTOLIC BLOOD PRESSURE: 84 MMHG | SYSTOLIC BLOOD PRESSURE: 120 MMHG | HEART RATE: 72 BPM | RESPIRATION RATE: 18 BRPM | TEMPERATURE: 97 F | HEIGHT: 68 IN

## 2021-09-21 DIAGNOSIS — Z12.5 SCREENING FOR PROSTATE CANCER: ICD-10-CM

## 2021-09-21 DIAGNOSIS — Z00.00 ENCOUNTER FOR ANNUAL HEALTH EXAMINATION: Primary | ICD-10-CM

## 2021-09-21 DIAGNOSIS — Z23 NEED FOR VACCINATION: ICD-10-CM

## 2021-09-21 DIAGNOSIS — N42.9 DISORDER OF PROSTATE, UNSPECIFIED: ICD-10-CM

## 2021-09-21 DIAGNOSIS — Z13.6 SCREENING FOR CARDIOVASCULAR CONDITION: ICD-10-CM

## 2021-09-21 DIAGNOSIS — Z11.59 NEED FOR HEPATITIS C SCREENING TEST: ICD-10-CM

## 2021-09-21 DIAGNOSIS — I10 ESSENTIAL (PRIMARY) HYPERTENSION: ICD-10-CM

## 2021-09-21 PROCEDURE — 90662 IIV NO PRSV INCREASED AG IM: CPT | Performed by: FAMILY MEDICINE

## 2021-09-21 PROCEDURE — G0008 ADMIN INFLUENZA VIRUS VAC: HCPCS | Performed by: FAMILY MEDICINE

## 2021-09-21 PROCEDURE — G0438 PPPS, INITIAL VISIT: HCPCS | Performed by: FAMILY MEDICINE

## 2021-09-21 RX ORDER — LOSARTAN POTASSIUM 25 MG/1
25 TABLET ORAL DAILY
Qty: 90 TABLET | Refills: 2 | Status: SHIPPED | OUTPATIENT
Start: 2021-09-21

## 2021-09-21 NOTE — PATIENT INSTRUCTIONS
Vinod Montenegro's SCREENING SCHEDULE   Tests on this list are recommended by your physician but may not be covered, or covered at this frequency, by your insurer. Please check with your insurance carrier before scheduling to verify coverage.    [place] Dubin [id number]) covered once after 65 Prevnar 13: 08/16/2019    [id number]: 09/08/2020     No recommendations at this time    Hepatitis B One screening covered for patients with certain risk factors   -  No recommendations at this time    Tetanus Toxoid Not

## 2021-09-21 NOTE — PROGRESS NOTES
Patient presents with:  Physical: flu shot given    HPI:   Timothy Astudillo is a 78year old male who presents for a Medicare Subsequent Annual Wellness visit (Pt already had Initial Annual Wellness).     HTN: Patient compliant with medication, low-salt d benign    Wt Readings from Last 3 Encounters:  09/21/21 : 186 lb (84.4 kg)  06/09/21 : 190 lb (86.2 kg)  04/12/21 : 187 lb (84.8 kg)     Last Cholesterol Labs:   Lab Results   Component Value Date    CHOLEST 150 09/10/2020    HDL 60 (H) 09/10/2020    LDL 7 standard drinks of alcohol per week. He reports current drug use. Frequency: 1.00 time per week. Drug: Cannabis. REVIEW OF SYSTEMS:   Review of Systems   Constitutional: Negative for appetite change, fatigue, fever and unexpected weight change.    HENT: Mouth/Throat: Oropharynx is clear and moist.   Eyes: Pupils are equal, round, and reactive to light. Conjunctivae and EOM are normal.   Neck: Neck supple. No thyromegaly present.    Cardiovascular: Normal rate, regular rhythm, normal heart sounds and [place] PSA, DIAGNOSTIC    Need for vaccination  -     FLU VACC HIGH DOSE PRSV FREE    Disorder of prostate, unspecified   -     PSA, DIAGNOSTIC    Essential (primary) hypertension well controlled  -     Losartan Potassium 25 MG Oral Tab;  Take 1 tablet (25 mg to 74 09/10/2020         Electrocardiogram (EKG)   Covered if needed at Welcome to Medicare, and non-screening if indicated for medical reasons 03/01/2020      Ultrasound Screening for Abdominal Aortic Aneurysm (AAA) Covered once in a lifetime for one of the Component Value Date    BUN 16 09/10/2020       Drug Serum Conc Annually No results found for: DIGOXIN, DIG, VALP

## 2021-09-23 LAB
ALBUMIN/GLOBULIN RATIO: 2.1 (CALC) (ref 1–2.5)
ALBUMIN: 4.8 G/DL (ref 3.6–5.1)
ALKALINE PHOSPHATASE: 60 U/L (ref 35–144)
ALT: 30 U/L (ref 9–46)
AST: 22 U/L (ref 10–35)
BILIRUBIN, TOTAL: 0.8 MG/DL (ref 0.2–1.2)
BUN: 17 MG/DL (ref 7–25)
CALCIUM: 9.9 MG/DL (ref 8.6–10.3)
CARBON DIOXIDE: 27 MMOL/L (ref 20–32)
CHLORIDE: 104 MMOL/L (ref 98–110)
CHOL/HDLC RATIO: 3.3 (CALC)
CHOLESTEROL, TOTAL: 182 MG/DL
CREATININE: 0.86 MG/DL (ref 0.7–1.25)
EGFR IF AFRICN AM: 104 ML/MIN/1.73M2
EGFR IF NONAFRICN AM: 90 ML/MIN/1.73M2
GLOBULIN: 2.3 G/DL (CALC) (ref 1.9–3.7)
GLUCOSE: 80 MG/DL (ref 65–99)
HDL CHOLESTEROL: 56 MG/DL
LDL-CHOLESTEROL: 106 MG/DL (CALC)
NON-HDL CHOLESTEROL: 126 MG/DL (CALC)
POTASSIUM: 4.8 MMOL/L (ref 3.5–5.3)
PROTEIN, TOTAL: 7.1 G/DL (ref 6.1–8.1)
PSA, TOTAL: 1.6 NG/ML
SIGNAL TO CUT-OFF: 0
SODIUM: 139 MMOL/L (ref 135–146)
TRIGLYCERIDES: 108 MG/DL

## 2022-05-02 ENCOUNTER — TELEPHONE (OUTPATIENT)
Dept: FAMILY MEDICINE CLINIC | Facility: CLINIC | Age: 67
End: 2022-05-02

## 2022-05-02 RX ORDER — LOSARTAN POTASSIUM 25 MG/1
25 TABLET ORAL DAILY
Qty: 90 TABLET | Refills: 1 | Status: SHIPPED | OUTPATIENT
Start: 2022-05-02

## 2022-05-02 NOTE — TELEPHONE ENCOUNTER
Pharmacy dispensed losartan 50mg  by mistake. Pharmacy confirmed with patient he has been cutting the 50mg pill in half. Pharmacy asking for new script for losartan 25mg.

## 2022-05-03 ENCOUNTER — LAB ENCOUNTER (OUTPATIENT)
Dept: LAB | Age: 67
End: 2022-05-03
Attending: INTERNAL MEDICINE
Payer: MEDICARE

## 2022-05-03 DIAGNOSIS — Z01.818 PRE-OP TESTING: ICD-10-CM

## 2022-05-04 LAB — SARS-COV-2 RNA RESP QL NAA+PROBE: NOT DETECTED

## 2022-05-06 ENCOUNTER — LAB ENCOUNTER (OUTPATIENT)
Dept: LAB | Age: 67
End: 2022-05-06
Attending: FAMILY MEDICINE
Payer: MEDICARE

## 2022-05-06 DIAGNOSIS — Z01.818 PRE-OP TESTING: ICD-10-CM

## 2022-05-07 LAB — SARS-COV-2 RNA RESP QL NAA+PROBE: NOT DETECTED

## 2022-05-09 PROBLEM — Z86.010 HISTORY OF ADENOMATOUS POLYP OF COLON: Status: ACTIVE | Noted: 2022-05-09

## 2022-05-09 PROBLEM — Z86.0101 HISTORY OF ADENOMATOUS POLYP OF COLON: Status: ACTIVE | Noted: 2022-05-09

## 2022-08-02 ENCOUNTER — TELEPHONE (OUTPATIENT)
Dept: FAMILY MEDICINE CLINIC | Facility: CLINIC | Age: 67
End: 2022-08-02

## 2022-08-02 NOTE — TELEPHONE ENCOUNTER
Pt dx with covid on 7/24 was feeling better but now has bad body aches and congestion is worse, pt asking for Rx

## 2022-08-02 NOTE — TELEPHONE ENCOUNTER
Called patient who states his symptoms started 7/24/22. Tested positive for covid. Initially had HA, slight fever, extreme fatigue, was in bed for several days. Felt like he was getting better and now is feeling worse again. Has body aches, congestion, and fatigue. Has been taking advil alternating with tylenol. Asking about paxlovid. Advised he is outside of the window where paxlovid would help. States he thought he might be. Advised to rest, push fluids, mucinex for congestion, cont tylenol/advil. Offered Video Visit with Dr. Romario Portillo tomorrow. States he'll see how he feels and if not improving will call back.

## 2022-09-23 ENCOUNTER — OFFICE VISIT (OUTPATIENT)
Dept: FAMILY MEDICINE CLINIC | Facility: CLINIC | Age: 67
End: 2022-09-23

## 2022-09-23 VITALS
SYSTOLIC BLOOD PRESSURE: 128 MMHG | TEMPERATURE: 98 F | BODY MASS INDEX: 27.89 KG/M2 | OXYGEN SATURATION: 98 % | HEIGHT: 68 IN | WEIGHT: 184 LBS | RESPIRATION RATE: 18 BRPM | DIASTOLIC BLOOD PRESSURE: 88 MMHG | HEART RATE: 67 BPM

## 2022-09-23 DIAGNOSIS — R01.1 HEART MURMUR: ICD-10-CM

## 2022-09-23 DIAGNOSIS — Z13.6 SCREENING FOR CARDIOVASCULAR CONDITION: ICD-10-CM

## 2022-09-23 DIAGNOSIS — Z12.5 SCREENING FOR PROSTATE CANCER: ICD-10-CM

## 2022-09-23 DIAGNOSIS — I10 ESSENTIAL HYPERTENSION, BENIGN: ICD-10-CM

## 2022-09-23 DIAGNOSIS — Z00.00 ENCOUNTER FOR ANNUAL HEALTH EXAMINATION: Primary | ICD-10-CM

## 2022-09-23 DIAGNOSIS — Z13.31 DEPRESSION SCREENING: ICD-10-CM

## 2022-09-23 DIAGNOSIS — M48.02 SPINAL STENOSIS IN CERVICAL REGION: ICD-10-CM

## 2022-09-23 DIAGNOSIS — H40.1211 LOW TENSION GLAUCOMA OF RIGHT EYE, MILD STAGE: ICD-10-CM

## 2022-09-23 PROCEDURE — G0444 DEPRESSION SCREEN ANNUAL: HCPCS | Performed by: FAMILY MEDICINE

## 2022-09-23 PROCEDURE — G0439 PPPS, SUBSEQ VISIT: HCPCS | Performed by: FAMILY MEDICINE

## 2022-09-23 PROCEDURE — 99213 OFFICE O/P EST LOW 20 MIN: CPT | Performed by: FAMILY MEDICINE

## 2022-09-23 PROCEDURE — 1125F AMNT PAIN NOTED PAIN PRSNT: CPT | Performed by: FAMILY MEDICINE

## 2022-09-23 RX ORDER — LOSARTAN POTASSIUM 25 MG/1
25 TABLET ORAL DAILY
Qty: 90 TABLET | Refills: 3 | Status: SHIPPED | OUTPATIENT
Start: 2022-09-23

## 2022-09-29 LAB
ALBUMIN/GLOBULIN RATIO: 2 (CALC) (ref 1–2.5)
ALBUMIN: 4.3 G/DL (ref 3.6–5.1)
ALKALINE PHOSPHATASE: 62 U/L (ref 35–144)
ALT: 22 U/L (ref 9–46)
AST: 20 U/L (ref 10–35)
BILIRUBIN, TOTAL: 0.5 MG/DL (ref 0.2–1.2)
BUN: 22 MG/DL (ref 7–25)
CALCIUM: 9.7 MG/DL (ref 8.6–10.3)
CARBON DIOXIDE: 31 MMOL/L (ref 20–32)
CHLORIDE: 105 MMOL/L (ref 98–110)
CHOL/HDLC RATIO: 2.9 (CALC)
CHOLESTEROL, TOTAL: 154 MG/DL
CREATININE: 1.02 MG/DL (ref 0.7–1.35)
EGFR: 80 ML/MIN/1.73M2
GLOBULIN: 2.2 G/DL (CALC) (ref 1.9–3.7)
GLUCOSE: 93 MG/DL (ref 65–99)
HDL CHOLESTEROL: 53 MG/DL
LDL-CHOLESTEROL: 83 MG/DL (CALC)
NON-HDL CHOLESTEROL: 101 MG/DL (CALC)
POTASSIUM: 4.7 MMOL/L (ref 3.5–5.3)
PROTEIN, TOTAL: 6.5 G/DL (ref 6.1–8.1)
PSA, TOTAL: 1.73 NG/ML
SODIUM: 139 MMOL/L (ref 135–146)
TRIGLYCERIDES: 90 MG/DL

## 2022-12-12 ENCOUNTER — TELEPHONE (OUTPATIENT)
Dept: FAMILY MEDICINE CLINIC | Facility: CLINIC | Age: 67
End: 2022-12-12

## 2022-12-12 NOTE — TELEPHONE ENCOUNTER
Called patient - name and  verified. He said he is having feeling after bowel moment that he has not cleared his system completely. Sometimes, there is a strong pain for about 10 minutes slowly fading. However, he has constant dull feeling of something obstructed or muscle pulled. No issues having BM. He was informed if it is sever constant pain to go to UC. He verbalized understanding.

## 2022-12-14 ENCOUNTER — HOSPITAL ENCOUNTER (OUTPATIENT)
Dept: GENERAL RADIOLOGY | Age: 67
Discharge: HOME | End: 2022-12-14
Attending: FAMILY MEDICINE
Payer: MEDICARE

## 2022-12-14 ENCOUNTER — OFFICE VISIT (OUTPATIENT)
Dept: FAMILY MEDICINE CLINIC | Facility: CLINIC | Age: 67
End: 2022-12-14
Payer: MEDICARE

## 2022-12-14 VITALS
HEIGHT: 68 IN | OXYGEN SATURATION: 98 % | WEIGHT: 189 LBS | HEART RATE: 80 BPM | TEMPERATURE: 98 F | BODY MASS INDEX: 28.64 KG/M2 | RESPIRATION RATE: 18 BRPM | DIASTOLIC BLOOD PRESSURE: 88 MMHG | SYSTOLIC BLOOD PRESSURE: 128 MMHG

## 2022-12-14 DIAGNOSIS — K57.90 DIVERTICULOSIS: ICD-10-CM

## 2022-12-14 DIAGNOSIS — R10.9 ABDOMINAL CRAMPING: Primary | ICD-10-CM

## 2022-12-14 DIAGNOSIS — R10.9 ABDOMINAL CRAMPING: ICD-10-CM

## 2022-12-14 PROCEDURE — 99213 OFFICE O/P EST LOW 20 MIN: CPT | Performed by: FAMILY MEDICINE

## 2022-12-14 PROCEDURE — 74018 RADEX ABDOMEN 1 VIEW: CPT | Performed by: FAMILY MEDICINE

## 2022-12-14 NOTE — PATIENT INSTRUCTIONS
Your abdominal pain/cramping could be due to constipation, will check x-ray of abdomen and if shows a lot of stool in colon will start with a laxative increasing fibre and water in diet. If x-ray is normal please start a probiotic and monitor your symptoms over the next couple of days, if pain worsens please call and let me know as we cannot rule out diverticulitis.

## 2023-02-01 ENCOUNTER — PATIENT MESSAGE (OUTPATIENT)
Dept: FAMILY MEDICINE CLINIC | Facility: CLINIC | Age: 68
End: 2023-02-01

## 2023-09-05 ENCOUNTER — PATIENT MESSAGE (OUTPATIENT)
Dept: FAMILY MEDICINE CLINIC | Facility: CLINIC | Age: 68
End: 2023-09-05

## 2023-09-05 DIAGNOSIS — I10 ESSENTIAL HYPERTENSION, BENIGN: ICD-10-CM

## 2023-09-05 DIAGNOSIS — Z00.00 ROUTINE GENERAL MEDICAL EXAMINATION AT A HEALTH CARE FACILITY: Primary | ICD-10-CM

## 2023-09-05 DIAGNOSIS — Z12.5 SCREENING FOR PROSTATE CANCER: ICD-10-CM

## 2023-09-05 DIAGNOSIS — Z13.29 SCREENING FOR THYROID DISORDER: ICD-10-CM

## 2023-09-05 DIAGNOSIS — Z13.0 SCREENING FOR DEFICIENCY ANEMIA: ICD-10-CM

## 2023-09-05 NOTE — TELEPHONE ENCOUNTER
Last physical 9/23/22    Last labs 9/28/22    Annual physical scheduled:  Future Appointments   Date Time Provider Cinda Cruz   9/15/2023  1:20 PM Michelle Cervantes MD [facility]     Routine fasting labs pended for your approval if ok. Please review and advise. Thank you.

## 2023-09-09 LAB
ABSOLUTE BASOPHILS: 39 CELLS/UL (ref 0–200)
ABSOLUTE EOSINOPHILS: 150 CELLS/UL (ref 15–500)
ABSOLUTE LYMPHOCYTES: 2067 CELLS/UL (ref 850–3900)
ABSOLUTE MONOCYTES: 371 CELLS/UL (ref 200–950)
ABSOLUTE NEUTROPHILS: 3874 CELLS/UL (ref 1500–7800)
ALBUMIN/GLOBULIN RATIO: 2 (CALC) (ref 1–2.5)
ALBUMIN: 4.7 G/DL (ref 3.6–5.1)
ALKALINE PHOSPHATASE: 60 U/L (ref 35–144)
ALT: 26 U/L (ref 9–46)
AST: 23 U/L (ref 10–35)
BASOPHILS: 0.6 %
BILIRUBIN, TOTAL: 0.6 MG/DL (ref 0.2–1.2)
BUN: 19 MG/DL (ref 7–25)
CALCIUM: 10 MG/DL (ref 8.6–10.3)
CARBON DIOXIDE: 28 MMOL/L (ref 20–32)
CHLORIDE: 104 MMOL/L (ref 98–110)
CHOL/HDLC RATIO: 2.7 (CALC)
CHOLESTEROL, TOTAL: 162 MG/DL
CREATININE: 1.03 MG/DL (ref 0.7–1.35)
EGFR: 79 ML/MIN/1.73M2
EOSINOPHILS: 2.3 %
GLOBULIN: 2.4 G/DL (CALC) (ref 1.9–3.7)
GLUCOSE: 86 MG/DL (ref 65–99)
HDL CHOLESTEROL: 60 MG/DL
HEMATOCRIT: 43.9 % (ref 38.5–50)
HEMOGLOBIN: 14.9 G/DL (ref 13.2–17.1)
LDL-CHOLESTEROL: 83 MG/DL (CALC)
LYMPHOCYTES: 31.8 %
MCH: 31.4 PG (ref 27–33)
MCHC: 33.9 G/DL (ref 32–36)
MCV: 92.4 FL (ref 80–100)
MONOCYTES: 5.7 %
MPV: 9.3 FL (ref 7.5–12.5)
NEUTROPHILS: 59.6 %
NON-HDL CHOLESTEROL: 102 MG/DL (CALC)
PLATELET COUNT: 250 THOUSAND/UL (ref 140–400)
POTASSIUM: 4.6 MMOL/L (ref 3.5–5.3)
PROTEIN, TOTAL: 7.1 G/DL (ref 6.1–8.1)
RDW: 12.5 % (ref 11–15)
RED BLOOD CELL COUNT: 4.75 MILLION/UL (ref 4.2–5.8)
SODIUM: 139 MMOL/L (ref 135–146)
TRIGLYCERIDES: 99 MG/DL
TSH W/REFLEX TO FT4: 2.11 MIU/L (ref 0.4–4.5)
WHITE BLOOD CELL COUNT: 6.5 THOUSAND/UL (ref 3.8–10.8)

## 2023-09-15 ENCOUNTER — OFFICE VISIT (OUTPATIENT)
Dept: FAMILY MEDICINE CLINIC | Facility: CLINIC | Age: 68
End: 2023-09-15
Payer: MEDICARE

## 2023-09-15 VITALS
OXYGEN SATURATION: 99 % | WEIGHT: 182.13 LBS | HEIGHT: 65.75 IN | HEART RATE: 97 BPM | SYSTOLIC BLOOD PRESSURE: 130 MMHG | TEMPERATURE: 98 F | DIASTOLIC BLOOD PRESSURE: 80 MMHG | RESPIRATION RATE: 16 BRPM | BODY MASS INDEX: 29.62 KG/M2

## 2023-09-15 DIAGNOSIS — Z00.00 ENCOUNTER FOR ANNUAL HEALTH EXAMINATION: Primary | ICD-10-CM

## 2023-09-15 DIAGNOSIS — L91.8 SKIN TAG: ICD-10-CM

## 2023-09-15 DIAGNOSIS — Z86.010 PERSONAL HISTORY OF COLONIC POLYPS: ICD-10-CM

## 2023-09-15 DIAGNOSIS — M48.02 SPINAL STENOSIS IN CERVICAL REGION: ICD-10-CM

## 2023-09-15 DIAGNOSIS — I10 ESSENTIAL HYPERTENSION, BENIGN: ICD-10-CM

## 2023-09-15 DIAGNOSIS — R01.1 HEART MURMUR: ICD-10-CM

## 2023-09-15 PROCEDURE — 99213 OFFICE O/P EST LOW 20 MIN: CPT | Performed by: FAMILY MEDICINE

## 2023-09-15 PROCEDURE — 11200 RMVL SKIN TAGS UP TO&INC 15: CPT | Performed by: FAMILY MEDICINE

## 2023-09-15 PROCEDURE — 1126F AMNT PAIN NOTED NONE PRSNT: CPT | Performed by: FAMILY MEDICINE

## 2023-09-15 PROCEDURE — G0439 PPPS, SUBSEQ VISIT: HCPCS | Performed by: FAMILY MEDICINE

## 2023-09-15 RX ORDER — LOSARTAN POTASSIUM 25 MG/1
25 TABLET ORAL DAILY
Qty: 90 TABLET | Refills: 3 | Status: SHIPPED | OUTPATIENT
Start: 2023-09-15

## 2023-09-22 ENCOUNTER — TELEPHONE (OUTPATIENT)
Dept: FAMILY MEDICINE CLINIC | Facility: CLINIC | Age: 68
End: 2023-09-22

## 2023-09-22 NOTE — TELEPHONE ENCOUNTER
Pt called stating he recently had an mónica with Dr Sheila Moya - 9-15-23 - all was good. Starting this past Sunday headaches started. Has had them ever since sometime more intense at times - intensity varies. Will take a Covid test.      Please advise.

## 2023-09-22 NOTE — TELEPHONE ENCOUNTER
Spoke with patient. Patient started having headaches starting on Sunday. Constant dull ache, but at times can be more intense. Patient can function as normal.  No nausea/vomiting, weakness or visual disturbances. No other symptoms. Blood pressure one hour after taking losartan 146/96. Patient took 600mg ibuprofen today and headache was almost relieved. Patient believes headaches could be allergy related as headaches intensified after cutting grass and being outside, golfing. Patient did take a Claritin about 20 min ago. Patient states that he will continue to monitor symptoms. He will take claritin daily and ibuprofen. If symptoms continue, he will schedule an appointment. Advised to continue to stay hydrated. If headache is \"the worse headache of his life,\" he is to proceed to the nearest ER. Patient took home COVID test today which is  negative. Agree with POC? Any further recommendations?

## 2023-10-04 ENCOUNTER — PATIENT MESSAGE (OUTPATIENT)
Dept: FAMILY MEDICINE CLINIC | Facility: CLINIC | Age: 68
End: 2023-10-04

## 2023-10-04 NOTE — TELEPHONE ENCOUNTER
From: Flores Plan  To: Ekaterina Stacy  Sent: 10/4/2023 2:14 PM CDT  Subject: Updated Vaccine Information     Hi,     Received a Covid booster and a flu shot today at [facility]. Receipts are attached. Would appreciate it if you updated my records and MyChart.     Thanks

## 2023-10-15 ENCOUNTER — HOSPITAL ENCOUNTER (OUTPATIENT)
Age: 68
Discharge: HOME | End: 2023-10-15
Payer: MEDICARE

## 2023-10-15 VITALS
BODY MASS INDEX: 29 KG/M2 | OXYGEN SATURATION: 99 % | WEIGHT: 179 LBS | HEART RATE: 73 BPM | SYSTOLIC BLOOD PRESSURE: 150 MMHG | TEMPERATURE: 98 F | RESPIRATION RATE: 16 BRPM | DIASTOLIC BLOOD PRESSURE: 84 MMHG

## 2023-10-15 DIAGNOSIS — R51.9 ACUTE NONINTRACTABLE HEADACHE, UNSPECIFIED HEADACHE TYPE: Primary | ICD-10-CM

## 2023-10-15 DIAGNOSIS — G47.9 DIFFICULTY SLEEPING: ICD-10-CM

## 2023-10-15 DIAGNOSIS — F41.9 ANXIETY: ICD-10-CM

## 2023-10-15 PROCEDURE — 99213 OFFICE O/P EST LOW 20 MIN: CPT | Performed by: NURSE PRACTITIONER

## 2023-10-15 NOTE — DISCHARGE INSTRUCTIONS
Start taking Zyrtec in the morning  STOP taking Advil PM at night  Increase water intake (~5-6 bottles per day)  Find time each day (~15min) to deep breath / meditate  You may benefit from cannabis before bed to help you sleep  Follow up with you care team if headaches persist to discuss possible benefit of a head CT

## 2023-10-15 NOTE — ED INITIAL ASSESSMENT (HPI)
Pt c/o numbness /tingling in fingers this morning; Pt rpt he has had \"allergy symptoms\" for past month, has been taking OTC medications, which have helped, however, symptoms of congestion and facial pressure came back over the weekend

## 2023-10-17 ENCOUNTER — OFFICE VISIT (OUTPATIENT)
Dept: FAMILY MEDICINE CLINIC | Facility: CLINIC | Age: 68
End: 2023-10-17
Payer: MEDICARE

## 2023-10-17 VITALS
DIASTOLIC BLOOD PRESSURE: 98 MMHG | SYSTOLIC BLOOD PRESSURE: 154 MMHG | WEIGHT: 184 LBS | OXYGEN SATURATION: 98 % | RESPIRATION RATE: 18 BRPM | BODY MASS INDEX: 29.93 KG/M2 | TEMPERATURE: 99 F | HEART RATE: 88 BPM | HEIGHT: 65.75 IN

## 2023-10-17 DIAGNOSIS — R51.9 NEW ONSET HEADACHE: Primary | ICD-10-CM

## 2023-10-17 DIAGNOSIS — R11.0 NAUSEA: ICD-10-CM

## 2023-10-17 DIAGNOSIS — I10 ESSENTIAL HYPERTENSION, BENIGN: ICD-10-CM

## 2023-10-17 PROCEDURE — 99214 OFFICE O/P EST MOD 30 MIN: CPT | Performed by: FAMILY MEDICINE

## 2023-10-17 RX ORDER — PREDNISONE 10 MG/1
TABLET ORAL
Qty: 15 TABLET | Refills: 0 | Status: SHIPPED | OUTPATIENT
Start: 2023-10-17 | End: 2023-10-22

## 2023-10-23 ENCOUNTER — PATIENT MESSAGE (OUTPATIENT)
Dept: FAMILY MEDICINE CLINIC | Facility: CLINIC | Age: 68
End: 2023-10-23

## 2023-10-24 NOTE — TELEPHONE ENCOUNTER
From: Shauna Price  To: Tereza Viera  Sent: 10/23/2023 4:35 PM CDT  Subject: CT Scan     Hi,    I have my CT Scan scheduled for Friday. Simple question: do I need to have someone with me to drive home? I didn't see it anywhere on the registration information , and I figured it means no, but I guess it's worth asking.     Thanks

## 2023-10-27 ENCOUNTER — HOSPITAL ENCOUNTER (OUTPATIENT)
Dept: CT IMAGING | Facility: HOSPITAL | Age: 68
Discharge: HOME | End: 2023-10-27
Attending: FAMILY MEDICINE

## 2023-10-27 ENCOUNTER — TELEPHONE (OUTPATIENT)
Dept: FAMILY MEDICINE CLINIC | Facility: CLINIC | Age: 68
End: 2023-10-27

## 2023-10-27 DIAGNOSIS — R51.9 NEW ONSET HEADACHE: ICD-10-CM

## 2023-10-27 PROCEDURE — 70450 CT HEAD/BRAIN W/O DYE: CPT | Performed by: FAMILY MEDICINE

## 2023-10-27 NOTE — TELEPHONE ENCOUNTER
Received call from [facility] in Radiology, CT scan. Patient is there now. Just had CT scan completed and is waiting for results. Advised to let patient know Dr. Ed Castro is not in the office today. I will send her the results now for her review. Dr. Ed Castro,  please advise   Apparently patient is anxious. Impression   CONCLUSION:       1. No acute intracranial process identified. 2. 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 A benign process is favored, though given the peripheral soft tissue  density component consider follow-up imaging in 3 months to assess interval change.

## 2023-10-28 NOTE — TELEPHONE ENCOUNTER
Called and left messge for patient with results, advised can follow up with me if he would like to discuss the results further. Yes

## 2023-10-29 ENCOUNTER — PATIENT MESSAGE (OUTPATIENT)
Dept: FAMILY MEDICINE CLINIC | Facility: CLINIC | Age: 68
End: 2023-10-29

## 2023-10-31 ENCOUNTER — PATIENT MESSAGE (OUTPATIENT)
Dept: FAMILY MEDICINE CLINIC | Facility: CLINIC | Age: 68
End: 2023-10-31

## 2023-10-31 NOTE — TELEPHONE ENCOUNTER
Future Appointments   Date Time Provider Cinda Nancy   11/1/2023  8:40 AM Harry Byrd MD [facility]

## 2023-11-01 ENCOUNTER — TELEMEDICINE (OUTPATIENT)
Dept: FAMILY MEDICINE CLINIC | Facility: CLINIC | Age: 68
End: 2023-11-01
Payer: MEDICARE

## 2023-11-01 DIAGNOSIS — I10 ESSENTIAL HYPERTENSION, BENIGN: ICD-10-CM

## 2023-11-01 DIAGNOSIS — R51.9 ACUTE INTRACTABLE HEADACHE, UNSPECIFIED HEADACHE TYPE: Primary | ICD-10-CM

## 2023-11-01 DIAGNOSIS — J30.1 SEASONAL ALLERGIC RHINITIS DUE TO POLLEN: ICD-10-CM

## 2023-11-01 PROCEDURE — 99213 OFFICE O/P EST LOW 20 MIN: CPT | Performed by: FAMILY MEDICINE
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
## 2023-11-08 LAB
(T11) IGE: <0.1 KU/L
(T8) IGE: <0.1 KU/L
ALTERNARIA ALTERNATA (M6) IGE: <0.1 KU/L
ASPERGILLUS FUMIGATUS (M3) IGE: <0.1 KU/L
BERMUDA GRASS (G2) IGE: <0.1 KU/L
CAT DANDER (E1) IGE: <0.1 KU/L
CLADOSPORIUM HERBARUM (M2) IGE: <0.1 KU/L
CLASS: 0
COCKROACH (I6) IGE: 0.26 KU/L
COMMON RAGWEED (SHORT)$(W1) IGE: <0.1 KU/L
COTTONWOOD (T14) IGE: <0.1 KU/L
DERMATOPHAGOIDES FARINAE (D2) IGE: <0.1 KU/L
DERMATOPHAGOIDES PTERONYSSINUS (D1) IGE: 0.11 KU/L
DOG DANDER (E5) IGE: <0.1 KU/L
HICKORY/PECAN TREE (T22)$IGE: <0.1 KU/L
IMMUNOGLOBULIN E: 94 KU/L
MAPLE (BOX ELDER) (T1)$IGE: <0.1 KU/L
MOUNTAIN CEDAR (T6) IGE: <0.1 KU/L
MOUSE URINE PROTEINS (E72) IGE: <0.1 KU/L
OAK (T7) IGE: <0.1 KU/L
PENICILLIUM NOTATUM (M1) IGE: <0.1 KU/L
ROUGH MARSH ELDER (W16)$IGE: <0.1 KU/L
ROUGH PIGWEED (W14) IGE: <0.1 KU/L
RUSSIAN THISTLE (W11) IGE: <0.1 KU/L
TIMOTHY GRASS (G6) IGE: <0.1 KU/L
WALNUT TREE (T10) IGE: <0.1 KU/L
WHITE ASH (T15) IGE: <0.1 KU/L
WHITE MULBERRY (T70) IGE: <0.1 KU/L

## 2024-01-04 ENCOUNTER — PATIENT MESSAGE (OUTPATIENT)
Dept: FAMILY MEDICINE CLINIC | Facility: CLINIC | Age: 69
End: 2024-01-04

## 2024-01-04 DIAGNOSIS — R51.9 CHRONIC INTRACTABLE HEADACHE, UNSPECIFIED HEADACHE TYPE: Primary | ICD-10-CM

## 2024-01-04 DIAGNOSIS — G89.29 CHRONIC INTRACTABLE HEADACHE, UNSPECIFIED HEADACHE TYPE: Primary | ICD-10-CM

## 2024-01-05 NOTE — TELEPHONE ENCOUNTER
Please review patient concern of HA since past year and advise.     LOV 10/17/23  ASSESSMENT AND PLAN:   Danny was seen today for headache.     Diagnoses and all orders for this visit:     New onset headache  -     CT BRAIN OR HEAD (83274); Future  -     predniSONE 10 MG Oral Tab; Take 5 tablets (50 mg total) by mouth daily for 1 day, THEN 4 tablets (40 mg total) daily for 1 day, THEN 3 tablets (30 mg total) daily for 1 day, THEN 2 tablets (20 mg total) daily for 1 day, THEN 1 tablet (10 mg total) daily for 1 day.  -    Reassured patient likely migraine as each time he had the headache the weather was changing and there was change in the barometric pressure.  -    advised to schedule CT due to new onset headache and paresthesia, r/o TIA     Nausea     Essential hypertension, benign uncontrolled

## 2024-01-08 ENCOUNTER — OFFICE VISIT (OUTPATIENT)
Dept: NEUROLOGY | Facility: CLINIC | Age: 69
End: 2024-01-08
Payer: MEDICARE

## 2024-01-08 VITALS
DIASTOLIC BLOOD PRESSURE: 84 MMHG | OXYGEN SATURATION: 97 % | BODY MASS INDEX: 31 KG/M2 | WEIGHT: 191 LBS | SYSTOLIC BLOOD PRESSURE: 138 MMHG | RESPIRATION RATE: 16 BRPM | HEART RATE: 88 BPM

## 2024-01-08 DIAGNOSIS — G44.209 TENSION HEADACHE: ICD-10-CM

## 2024-01-08 DIAGNOSIS — G44.52 NEW PERSISTENT DAILY HEADACHE: Primary | ICD-10-CM

## 2024-01-08 PROCEDURE — 99204 OFFICE O/P NEW MOD 45 MIN: CPT | Performed by: OTHER

## 2024-01-08 RX ORDER — AMITRIPTYLINE HYDROCHLORIDE 25 MG/1
25 TABLET, FILM COATED ORAL NIGHTLY
Qty: 30 TABLET | Refills: 5 | Status: SHIPPED | OUTPATIENT
Start: 2024-01-08

## 2024-01-08 RX ORDER — DICLOFENAC SODIUM 75 MG/1
75 TABLET, DELAYED RELEASE ORAL 2 TIMES DAILY PRN
Qty: 30 TABLET | Refills: 0 | Status: SHIPPED | OUTPATIENT
Start: 2024-01-08

## 2024-01-08 RX ORDER — ACETAMINOPHEN 500 MG
500 TABLET ORAL AS NEEDED
COMMUNITY

## 2024-01-08 NOTE — PROGRESS NOTES
Patient states HA occur daily since summer 2023. Patient states HA occur mostly on the top of the head. Denies changes to memory, speech or balance. Denies facial numbness or tingling. Denies head trauma.

## 2024-01-08 NOTE — PATIENT INSTRUCTIONS
Take Amitriptyline 25 mg an hour of bedtime.      2. Take Diclofenac 75 mg BID as needed for mod to severe pain      3. Use ice packs or neck relaxation technique                      Refill policies:    Allow 2-3 business days for refills; controlled substances may take longer.  Contact your pharmacy at least 5 days prior to running out of medication and have them send an electronic request or submit request through the “request refill” option in your [employer] account.  Refills are not addressed on weekends; covering physicians do not authorize routine medications on weekends.  No narcotics or controlled substances are refilled after noon on Fridays or by on call physicians.  By law, narcotics must be electronically prescribed.  A 30 day supply with no refills is the maximum allowed.  If your prescription is due for a refill, you may be due for a follow up appointment.  To best provide you care, patients receiving routine medications need to be seen at least once a year.  Patients receiving narcotic/controlled substance medications need to be seen at least once every 3 months.  In the event that your preferred pharmacy does not have the requested medication in stock (e.g. Backordered), it is your responsibility to find another pharmacy that has the requested medication available.  We will gladly send a new prescription to that pharmacy at your request.    Scheduling Tests:    If your physician has ordered radiology tests such as MRI or CT scans, please contact Central Scheduling at [phone] right away to schedule the test.  Once scheduled, the [facility] Centralized Referral Team will work with your insurance carrier to obtain pre-certification or prior authorization.  Depending on your insurance carrier, approval may take 3-10 days.  It is highly recommended patients assure they have received an authorization before having a test performed.  If test is done without insurance authorization, patient may be  responsible for the entire amount billed.      Precertification and Prior Authorizations:  If your physician has recommended that you have a procedure or additional testing performed the [facility] Centralized Referral Team will contact your insurance carrier to obtain pre-certification or prior authorization.    You are strongly encouraged to contact your insurance carrier to verify that your procedure/test has been approved and is a COVERED benefit.  Although the [facility] Centralized Referral Team does its due diligence, the insurance carrier gives the disclaimer that \"Although the procedure is authorized, this does not guarantee payment.\"    Ultimately the patient is responsible for payment.   Thank you for your understanding in this matter.  Paperwork Completion:  If you require FMLA or disability paperwork for your recovery, please make sure to either drop it off or have it faxed to our office at [phone]. Be sure the form has your name and date of birth on it.  The form will be faxed to our Forms Department and they will complete it for you.  There is a 25$ fee for all forms that need to be filled out.  Please be aware there is a 10-14 day turnaround time.  You will need to sign a release of information (NIGHAT) form if your paperwork does not come with one.  You may call the Forms Department with any questions at [phone].  Their fax number is [phone].
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
## 2024-01-09 ENCOUNTER — PATIENT MESSAGE (OUTPATIENT)
Dept: NEUROLOGY | Facility: CLINIC | Age: 69
End: 2024-01-09

## 2024-01-09 NOTE — TELEPHONE ENCOUNTER
From: Danny Montenegro  To: Shabnam Reddingqmainor  Sent: 1/9/2024 9:54 AM CST  Subject: Blood test fasting & medication     Hi. I have scheduled my blood test for Friday morning. Do I need to fast ([facility] said to ask you ) ? Also, should I take my new medication Thursday night?     Thanks

## 2024-01-12 LAB — SED RATE BY MODIFIED$WESTERGREN: 2 MM/H

## 2024-04-08 ENCOUNTER — OFFICE VISIT (OUTPATIENT)
Dept: NEUROLOGY | Facility: CLINIC | Age: 69
End: 2024-04-08
Payer: MEDICARE

## 2024-04-08 VITALS
OXYGEN SATURATION: 98 % | SYSTOLIC BLOOD PRESSURE: 138 MMHG | BODY MASS INDEX: 31 KG/M2 | DIASTOLIC BLOOD PRESSURE: 96 MMHG | HEART RATE: 98 BPM | WEIGHT: 192 LBS | RESPIRATION RATE: 14 BRPM

## 2024-04-08 DIAGNOSIS — G44.52 NEW PERSISTENT DAILY HEADACHE: Primary | ICD-10-CM

## 2024-04-08 PROCEDURE — 99213 OFFICE O/P EST LOW 20 MIN: CPT | Performed by: OTHER

## 2024-04-08 RX ORDER — AMITRIPTYLINE HYDROCHLORIDE 10 MG/1
10 TABLET, FILM COATED ORAL NIGHTLY
Qty: 30 TABLET | Refills: 2 | Status: SHIPPED | OUTPATIENT
Start: 2024-04-08
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
## 2024-04-08 NOTE — PATIENT INSTRUCTIONS
Refill policies:    Allow 2-3 business days for refills; controlled substances may take longer.  Contact your pharmacy at least 5 days prior to running out of medication and have them send an electronic request or submit request through the “request refill” option in your [employer] account.  Refills are not addressed on weekends; covering physicians do not authorize routine medications on weekends.  No narcotics or controlled substances are refilled after noon on Fridays or by on call physicians.  By law, narcotics must be electronically prescribed.  A 30 day supply with no refills is the maximum allowed.  If your prescription is due for a refill, you may be due for a follow up appointment.  To best provide you care, patients receiving routine medications need to be seen at least once a year.  Patients receiving narcotic/controlled substance medications need to be seen at least once every 3 months.  In the event that your preferred pharmacy does not have the requested medication in stock (e.g. Backordered), it is your responsibility to find another pharmacy that has the requested medication available.  We will gladly send a new prescription to that pharmacy at your request.    Scheduling Tests:    If your physician has ordered radiology tests such as MRI or CT scans, please contact Central Scheduling at [phone] right away to schedule the test.  Once scheduled, the [facility] Centralized Referral Team will work with your insurance carrier to obtain pre-certification or prior authorization.  Depending on your insurance carrier, approval may take 3-10 days.  It is highly recommended patients assure they have received an authorization before having a test performed.  If test is done without insurance authorization, patient may be responsible for the entire amount billed.      Precertification and Prior Authorizations:  If your physician has recommended that you have a procedure or additional testing performed the [facility]  Centralized Referral Team will contact your insurance carrier to obtain pre-certification or prior authorization.    You are strongly encouraged to contact your insurance carrier to verify that your procedure/test has been approved and is a COVERED benefit.  Although the [facility] Centralized Referral Team does its due diligence, the insurance carrier gives the disclaimer that \"Although the procedure is authorized, this does not guarantee payment.\"    Ultimately the patient is responsible for payment.   Thank you for your understanding in this matter.  Paperwork Completion:  If you require FMLA or disability paperwork for your recovery, please make sure to either drop it off or have it faxed to our office at [phone]. Be sure the form has your name and date of birth on it.  The form will be faxed to our Forms Department and they will complete it for you.  There is a 25$ fee for all forms that need to be filled out.  Please be aware there is a 10-14 day turnaround time.  You will need to sign a release of information (NIGHAT) form if your paperwork does not come with one.  You may call the Forms Department with any questions at [phone].  Their fax number is [phone].

## 2024-04-15 ENCOUNTER — PATIENT MESSAGE (OUTPATIENT)
Dept: FAMILY MEDICINE CLINIC | Facility: CLINIC | Age: 69
End: 2024-04-15

## 2024-04-18 NOTE — TELEPHONE ENCOUNTER
From: Danny Montenegro  To: Aisha Healy  Sent: 4/15/2024 9:06 AM CDT  Subject: Twisted Ankle    Good morning. In early February I “turned” my ankle on the edge of a sidewalk. At first the pain was intense but subsided after a few weeks. However, the pain never went away entirely and although I am able to walk and participate in recreational activities (golf, bowling, cycling) the pain never goes away, and increases especially when I walk on uneven surfaces. I do a lot of limping.     So, I guess I’d like to have someone take a look to see what’s going on. Do I need to see Dr. Healy or if I need to see a specialist, could Dr. Healy recommend one?     Thanks and let me know.     Have a good day!

## 2024-04-19 ENCOUNTER — OFFICE VISIT (OUTPATIENT)
Dept: FAMILY MEDICINE CLINIC | Facility: CLINIC | Age: 69
End: 2024-04-19
Payer: MEDICARE

## 2024-04-19 VITALS
DIASTOLIC BLOOD PRESSURE: 82 MMHG | BODY MASS INDEX: 31.72 KG/M2 | TEMPERATURE: 98 F | RESPIRATION RATE: 18 BRPM | SYSTOLIC BLOOD PRESSURE: 118 MMHG | WEIGHT: 195 LBS | HEIGHT: 65.75 IN | HEART RATE: 80 BPM | OXYGEN SATURATION: 98 %

## 2024-04-19 DIAGNOSIS — M25.572 ACUTE LEFT ANKLE PAIN: Primary | ICD-10-CM

## 2024-04-19 DIAGNOSIS — G44.89 OTHER HEADACHE SYNDROME: ICD-10-CM

## 2024-04-19 PROCEDURE — 96127 BRIEF EMOTIONAL/BEHAV ASSMT: CPT | Performed by: FAMILY MEDICINE

## 2024-04-19 PROCEDURE — 99213 OFFICE O/P EST LOW 20 MIN: CPT | Performed by: FAMILY MEDICINE
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
## 2024-04-25 ENCOUNTER — OFFICE VISIT (OUTPATIENT)
Dept: PODIATRY CLINIC | Facility: CLINIC | Age: 69
End: 2024-04-25
Payer: MEDICARE

## 2024-04-25 DIAGNOSIS — S93.402A MILD ANKLE SPRAIN, LEFT, INITIAL ENCOUNTER: Primary | ICD-10-CM

## 2024-04-25 PROCEDURE — 99203 OFFICE O/P NEW LOW 30 MIN: CPT | Performed by: PODIATRIST
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
## 2024-05-07 ENCOUNTER — OFFICE VISIT (OUTPATIENT)
Dept: PHYSICAL THERAPY | Age: 69
End: 2024-05-07
Attending: PODIATRIST
Payer: MEDICARE

## 2024-05-07 DIAGNOSIS — S93.402A MILD ANKLE SPRAIN, LEFT, INITIAL ENCOUNTER: Primary | ICD-10-CM

## 2024-05-07 PROCEDURE — 97140 MANUAL THERAPY 1/> REGIONS: CPT

## 2024-05-07 PROCEDURE — 97161 PT EVAL LOW COMPLEX 20 MIN: CPT

## 2024-05-07 PROCEDURE — 97110 THERAPEUTIC EXERCISES: CPT

## 2024-05-07 NOTE — PROGRESS NOTES
LOWER EXTREMITY EVALUATION:     Diagnosis:   Mild ankle sprain, left, initial encounter (S93.402A)      Referring Provider: Tj Zayas  Date of Evaluation:    5/7/2024    Precautions:  None Next MD visit:   none scheduled  Date of Surgery: n/a     PATIENT SUMMARY   Danny Montenegro is a 69 year old male who presents to therapy today with complaints of L lateral ankle pain that began in February after rolling ankle while walking. No ecchymosis. Has steadily improved and returned to bowling, biking, golfing. Has c/o daily ongoing lateral ankle shooting pain, burning sensation. Increases with activity and worse with resting after completing activities. Doesn't wake at night.  Recent x ray negative. No meds or modalities.     Pt describes pain level current 6/10, at best 2/10, at worst 9/10.   Current functional limitations include some difficulty with walking level surfaces and stairs, recreational activities, cutting lawn/house chores.     Danny describes prior level of function 2-3 days golf a week, 25 mile bike rides, bowling weekly in league which is ending. Retired. No history of ankle limitations.      Pt goals include resolve pain and complaints so he can return to active lifestyle w/o limitations.   Past medical history was reviewed with Danny.     ASSESSMENT  Danny is a 69 year old male who presents to therapy today with complaints of L lateral ankle pain that began in February after rolling ankle while walking. No ecchymosis. Has steadily improved and returned to bowling, biking, golfing. Has c/o daily ongoing lateral ankle shooting pain, burning sensation. Increases with activity and worse with resting after completing activities. Doesn't wake at night.  Recent x ray negative. No meds or modalities.     The results of the objective tests and measures show lateral ankle pain was reproduced during gait, inversion and eversion stress tests, MMT of ankle eversion and palpation of the CF ligament.   Functional deficits include but are not limited to some difficulty with walking level surfaces and stairs, recreational activities, cutting lawn/house chores.     Signs and symptoms are consistent with diagnosis of L ankle CFL strain. Pt and PT discussed evaluation findings, pathology, POC and HEP.  Pt voiced understanding and performs HEP correctly without reported pain. Skilled Physical Therapy is medically necessary to address the above impairments and reach functional goals.     OBJECTIVE:   Observation: gait unremarkable, pain with toe off and heel strike. No edema noted.   Palpation: pain at the L CF ligament      AROM:   Foot/Ankle   All WNL     Accessory motion: ST joint restricted into eversion, only movement to neutral, full inversion    Flexibility:  Hamstrings: R -45; L -30 passive SLR  Gastroc-soleus: WNL      Strength/MMT:   Foot/Ankle   L ankle with full single CKC PF  and DF, > 4/5  Inversion 4/5  Eversion 3+/5 pain     Special tests:   Negative neural tension test passive SLR with head flex and ext  + inversion and eversion stress test for lateral pain    Balance: SLS: R > 20 sec, L <5 sec    Today’s Treatment and Response:   Pt education was provided on exam findings, treatment diagnosis, treatment plan, expectations, and prognosis. Pt was also provided recommendations for activity modifications and modalities as needed [ice/heat]. Pt to decrease bike rides to 50% of mileage. Use  after recreational activities and house chores.   Tx was STM to CF ligament x 3 min, grade 3,4 mobes for ST joint eversion x 3 min followed by instruction on self tissue mobilization to ligament x 1 min for HEP followed by L ankle eversion AROM with yellow band x 15 reps. HO and band issued for HEP.      Charges: PT Reilly Low Complexity, man 1 TE 1      Total Timed Treatment: 20 min     Total Treatment Time: 45 min       PLAN OF CARE:    Goals: (to be met in 6 visits)  -Improve R ankle eversion strength to > 4/5 so pt can  make directions changes w/o limitations from pain  -Improve loading tolerance to the CF ligament so pt can perform recreational activities with little to no restrictions due to lateral ankle pain.  -Improve R ST joint eversion ROM to WNL so pt ambulate 1 mile or > w/o limitations from pain  -Independent with progressive HEP    Frequency / Duration: Patient will be seen for 1-2 x/week or a total of 6 visits over a 90 day period. Treatment will include: Manual Therapy, Neuromuscular Re-education, Therapeutic Exercise, and Home Exercise Program instruction    Education or treatment limitation: None  Rehab Potential:good    LEFS Score  LEFS Score: 80 % (5/7/2024 10:23 AM)      Patient/Family/Caregiver was advised of these findings, precautions, and treatment options and has agreed to actively participate in planning and for this course of care.    Thank you for your referral. Please co-sign or sign and return this letter via fax as soon as possible to [phone]. If you have any questions, please contact me at Dept: [phone]    Sincerely,  Electronically signed by therapist: Azam Washburn, PT  Physician's certification required: Yes  I certify the need for these services furnished under this plan of treatment and while under my care.    X___________________________________________________ Date____________________    Certification From: 5/7/2024  To:8/5/2024

## 2024-05-14 ENCOUNTER — OFFICE VISIT (OUTPATIENT)
Dept: PHYSICAL THERAPY | Age: 69
End: 2024-05-14
Attending: PODIATRIST

## 2024-05-14 PROCEDURE — 97110 THERAPEUTIC EXERCISES: CPT

## 2024-05-14 PROCEDURE — 97140 MANUAL THERAPY 1/> REGIONS: CPT

## 2024-05-14 NOTE — PROGRESS NOTES
Diagnosis:   Mild ankle sprain, left, initial encounter (S93.402A)      Referring Provider: Tj Zayas  Date of Evaluation:    5/7/2024    Precautions:  None Next MD visit:   none scheduled  Date of Surgery: n/a     Insurance Primary/Secondary: MEDICARE / [place] INDEMN[place]       # Auth Visits: 6 Total Timed Treatment: 40 min    Total Treatment time: 40 min Charges: man 1 TE 2  Treatment Number: 2 of 6    Subjective: Danny Montenegro is a 69 year old male who presents to therapy today with complaints of L lateral ankle pain that began in February after rolling ankle while walking. No ecchymosis. Has steadily improved and returned to bowling, biking, golfing. Has c/o daily ongoing lateral ankle shooting pain, burning sensation. Increases with activity and worse with resting after completing activities. Doesn't wake at night.  Recent x ray negative. No meds or modalities.     Has made activity modifications as asked, some better.     Pt describes pain level current 2/10, at best 2/10, at worst 9/10.     Objective:  Gait Unremarkable    Palpation tender at  CF ligament.    Man PT: 10 min  IASTM to CF ligament x 8 min  grade 3,4 mobes for ST joint eversion x 4 min    TE:  ST joint eversion PROM x 20 reps  L ankle eversion AROM with yellow band x 25 reps. 2 sets Manual and v/c for no hip ER.  Again with red band x 15 reps progress HEP HO and band issued for HEP.      ST joint eversion ROM 5*  Eversion MMT 4-/5    Discuss shoe wear and inspection. Pt to check shoe for lateral excessive wear. Present shoes with none.     HEP: as noted above    Assessment: Pt has modified activity as asked. Preforming HEP as asked. Pt with excessive hip ER for substitution during ankle eversion, practiced better. HEP progressed from yellow to red resistance for promotion of ankle strength and stability. ST joint restrictions addressed and ankle eversion strength, goals 1,3. Pt educated on shoe wear due to foot posture, correction  needed to decrease unnecessary load on lateral ankle structures, prevent reinjury. Current shoes present today with no wear. Regular foot wear at home, uneven wear pattern reported. Band issued for HEP. Pt with no ankle symptoms with standing or walking after tx,.       Goals: (to be met in 6 visits)  -Improve R ankle eversion strength to > 4/5 so pt can make directions changes w/o limitations from pain  -Improve loading tolerance to the CF ligament so pt can perform recreational activities with little to no restrictions due to lateral ankle pain.  -Improve R ST joint eversion ROM to WNL so pt ambulate 1 mile or > w/o limitations from pain  -Independent with progressive HEP    LEFS Score  LEFS Score: 80 % (5/7/2024 10:23 AM)    Plan: pt to f/u in 1 week. Check progress.

## 2024-05-23 ENCOUNTER — OFFICE VISIT (OUTPATIENT)
Dept: PHYSICAL THERAPY | Age: 69
End: 2024-05-23
Attending: PODIATRIST

## 2024-05-23 PROCEDURE — 97140 MANUAL THERAPY 1/> REGIONS: CPT

## 2024-05-23 PROCEDURE — 97110 THERAPEUTIC EXERCISES: CPT

## 2024-05-23 PROCEDURE — 97112 NEUROMUSCULAR REEDUCATION: CPT

## 2024-05-23 NOTE — PROGRESS NOTES
Diagnosis:   Mild ankle sprain, left, initial encounter (S93.402A)      Referring Provider: Tj Zayas  Date of Evaluation:    5/7/2024    Precautions:  None Next MD visit:   none scheduled  Date of Surgery: n/a     Insurance Primary/Secondary: MEDICARE / [place]DEMN[place]       # Auth Visits: 6 Total Timed Treatment: 40 min    Total Treatment time: 40 min Charges: man 1 TE 1 NM re ed 1  Treatment Number: 3 of 6    Subjective:  Better. Cutting grass with no pain,, golfing with no pain. Still using cart at golf course.Some fatigue at lateral ankle is current complaint. Pleased with steady progress. Did check shoe wear at shoes at home and found most with excessive lateral wear, L>R. Will replace.       Pt describes pain level current /10, at best 0/10, at worst 3/10.     Objective:  Gait Unremarkable    Palpation negative at  CF ligament.    Man PT: 10 min  IASTM with HG toll 9, to CF ligament x 8 min  grade 3,4 mobes for ST joint eversion x 4 min    TE:  ST joint eversion PROM x 20 reps  L ankle eversion AROM with  red band x 20 reps HEP.      NM re ed:  Sit:  Baps board with #2 level with L CW and CCW x 15 each    In p bars:  B heel raises full motion, add ST joint inversion, hold 2-3 seconds x 15 reps, slight UE assist for balance add HEP  Heel walk x 10 ft, slight UE assist balance, 4 sets add HEP  Toe walk x 10 ft, slight UE assist balance, 4 sets add HEP    ST joint eversion ROM 5*  Eversion MMT 4+/5    HEP: as noted above    Assessment: Pt well improved since last tx. Minor ache, fatigue with previous activities that were painful at lateral ankle injury, CF ligament, goal 1. Previously + palpation now negative.   ST joint ROM WNL, goal 3. Eversion strength improved a noted above, goal 1 HEP was reviewed and progressed as noted above with further promote balance, tissue loading and control at lateral ankle. Did well, no pain, HO issued for additional exercises. Progressing as expected.      Goals: (to be  met in 6 visits)  -Improve R ankle eversion strength to > 4/5 so pt can make directions changes w/o limitations from pain  -Improve loading tolerance to the CF ligament so pt can perform recreational activities with little to no restrictions due to lateral ankle pain.  -Improve R ST joint eversion ROM to WNL so pt ambulate 1 mile or > w/o limitations from pain  -Independent with progressive HEP    LEFS Score  LEFS Score: 80 % (5/7/2024 10:23 AM)    Plan: pt to f/u in 1 week. Check progress.

## 2024-05-29 ENCOUNTER — OFFICE VISIT (OUTPATIENT)
Dept: PHYSICAL THERAPY | Age: 69
End: 2024-05-29
Attending: PODIATRIST

## 2024-05-29 PROCEDURE — 97140 MANUAL THERAPY 1/> REGIONS: CPT

## 2024-05-29 PROCEDURE — 97110 THERAPEUTIC EXERCISES: CPT

## 2024-05-29 NOTE — PROGRESS NOTES
Diagnosis:   Mild ankle sprain, left, initial encounter (S93.402A)      Referring Provider: Tj Zayas  Date of Evaluation:    5/7/2024    Precautions:  None Next MD visit:   none scheduled  Date of Surgery: n/a     Insurance Primary/Secondary: [place] / [place]       # Auth Visits: 6 Total Timed Treatment: 40 min    Total Treatment time: 40 min Charges: man 1 TE 2  Treatment Number: 4 of 6    Subjective:  Pt reports some lateral ankle and plantar foot irritability following a few days of extensive golf games.        Pt describes pain level current /10, at best 0/10, at worst 3/10.     Objective:  Gait Unremarkable    TE:  Palpation tender at  ATF CF ligaments.  Negative plantar fascia  MMT ankle inver/ever 4/5 each  MMT great toe and digit flexion 4-/5   extension > 4/5    Man PT: 10 min  IASTM with HG toll 9, to ATF and CF ligaments x 10 min  grade 3,4 mobes for ST joint eversion x 4 min    TE:  ST joint eversion PROM x 20 reps  L ankle eversion AROM with  red progressed to blue band x 20 reps HEP.    L ankle inversion AROM with  blue  band x 20 reps add HEP    Toe curls with towel x 30 sec x 2 sets add HEP    In p bars:  B heel raises full motion, add ST joint inversion, hold 2-3 seconds x 15 reps, slight UE assist for balance hold HEP  Heel walk x 10 ft, slight UE assist balance, 4 sets hold HEP  Toe walk x 10 ft, slight UE assist balance, 4 sets hold HEP    HEP: as noted above    Assessment: Pt with increased lateral ankle irritability at ligaments. Mild sprain from excessive golfing, walking on uneven grounds, sand traps. No ecchymosis or additional edema.  Will hold previous HEP of FWB CKC exercises due to irritability, focus on new HEP which produced no pain. HO and band issued for additional HEP.       Goals: (to be met in 6 visits)  -Improve R ankle eversion strength to > 4/5 so pt can make directions changes w/o limitations from pain  -Improve loading tolerance to the CF ligament so pt  can perform recreational activities with little to no restrictions due to lateral ankle pain.  -Improve R ST joint eversion ROM to WNL so pt ambulate 1 mile or > w/o limitations from pain  -Independent with progressive HEP    LEFS Score  LEFS Score: 80 % (5/7/2024 10:23 AM)    Plan: pt to f/u in 1 week. Check progress.

## 2024-06-04 ENCOUNTER — APPOINTMENT (OUTPATIENT)
Dept: PHYSICAL THERAPY | Age: 69
End: 2024-06-04
Attending: PODIATRIST
Payer: MEDICARE

## 2024-06-06 ENCOUNTER — OFFICE VISIT (OUTPATIENT)
Dept: PHYSICAL THERAPY | Age: 69
End: 2024-06-06
Attending: PODIATRIST
Payer: MEDICARE

## 2024-06-06 PROCEDURE — 97110 THERAPEUTIC EXERCISES: CPT

## 2024-06-06 PROCEDURE — 97140 MANUAL THERAPY 1/> REGIONS: CPT

## 2024-06-06 NOTE — PROGRESS NOTES
Diagnosis:   Mild ankle sprain, left, initial encounter (S93.402A)      Referring Provider: Tj Zayas  Date of Evaluation:    5/7/2024    Precautions:  None Next MD visit:   none scheduled  Date of Surgery: n/a     Insurance Primary/Secondary: [place] / [place]       # Auth Visits: 6 Total Timed Treatment: 40 min    Total Treatment time: 40 min Charges: man 1 TE 2  Treatment Number: 5 of 6    Subjective:  Pt reports previous exacerbation of L lateral ankle sprain and pain is well improved. He has decreased some activity level but continues to golf regularly several times a week. He reports some days golfing with no noticeable ankle pain. He continues with daily HEP of t band exercises which he feels is beneficial to his recovery.       Pt describes pain level current 0/10, at best 0/10, at worst 3/10.     Objective:  Gait Unremarkable    TE:  Palpation slight tender at  ATF, negative at CF ligaments.  Negative plantar fascia  MMT ankle inver/ever 4+/5 each  MMT great toe and digit flexion 4+/5   extension > 4/5    Man PT: 15 min  IASTM with HG tool 9, to ATF and CF ligaments x 10 min  grade 3,4 mobes for ST joint eversion x 4 min      TE:  ST joint eversion PROM x 20 reps  L ankle inversion and eversion PROM to end ranges x 10 each  L ankle inversion and eversion AROM to end ranges x 10 each  Toe flexion and extension AROM x 10 each   L ankle eversion AROM with   blue band x 20 reps HEP.    L ankle inversion AROM with  blue  band x 20 reps  HEP    Toe curls with towel x 30 sec x 2 sets HEP    HEP: as noted above    Assessment: Previous exacerbation of L lateral ankle sprain and pain is well improved. Good ankle and digit strength that is steadily improving as noted above. No pain at CF ligament today with palpation or exercise. Tender at ATF ligament with moderate pressure palpation, otherwise negative with exercise. Will continue with current HEP and activity modifications, cont with regular  golf. If steady progress continues will progress program to WB balance and proprioceptive exercises.   Pt on target for all goals. Will most likely need to extend tx from the recommended 6 sessions to 8 or 9 sessions due to recent exacerbation of his chronic lateral ankle pain/sprain.     Goals: (to be met in 6 visits)  -Improve R ankle eversion strength to > 4/5 so pt can make directions changes w/o limitations from pain  -Improve loading tolerance to the CF ligament so pt can perform recreational activities with little to no restrictions due to lateral ankle pain.  -Improve R ST joint eversion ROM to WNL so pt ambulate 1 mile or > w/o limitations from pain  -Independent with progressive HEP    LEFS Score  LEFS Score: 80 % (5/7/2024 10:23 AM)    Plan: pt to f/u in 1 week. Check progress. Reassess and make recommendations. Can initiate single leg balance exercises including star pattern toe taps.

## 2024-06-12 ENCOUNTER — OFFICE VISIT (OUTPATIENT)
Dept: PHYSICAL THERAPY | Age: 69
End: 2024-06-12
Attending: PODIATRIST
Payer: MEDICARE

## 2024-06-12 PROCEDURE — 97112 NEUROMUSCULAR REEDUCATION: CPT

## 2024-06-12 PROCEDURE — 97110 THERAPEUTIC EXERCISES: CPT

## 2024-06-12 NOTE — PROGRESS NOTES
Diagnosis:   Mild ankle sprain, left, initial encounter (S93.402A)      Referring Provider: Tj Zayas  Date of Evaluation:    5/7/2024    Precautions:  None Next MD visit:   none scheduled  Date of Surgery: n/a     Insurance Primary/Secondary: MEDIC[place] / [place]MN[place]       # Auth Visits: 6 Total Timed Treatment: 40 min    Total Treatment time: 40 min Charges: NMR 1 TE 2    Physical Therapy Progress Report  Treatment Number: 6 of 6    Subjective:  Pt reports recent exacerbation of L lateral ankle sprain and pain is well improved. He has decreased some activity level but continues to golf regularly several times a week. He reports some days golfing with no noticeable ankle pain. He continues with daily HEP which he feels is beneficial to his recovery.   He reports a 50%-75% improvement with L ankle complaints, strength and balance.    Pt describes pain level current 0/10, at best 0/10, at worst 3/10.     Objective:  Gait Unremarkable    TE:  Long sit:    Palpation negative at  ATF, and CF ligaments.  Negative plantar fascia  MMT ankle and all digits 4+/5  grade 3,4 mobes for ST joint eversion x 4 min    Stand:  L single heel raise 80%  R 100%  L ankle PF eccentric lowering x 10 reps HEP  L ankle single heel raise with min B UE assist x 5 reps HEP    NMR: 15 min  Single leg balance, 3 trials each best time L 18 sec  R 23  Single leg balance with contralateral toe taps, all 4 positions, min B UE support progressed to slight single UE asst for balance x 10-15 reps each side HEP  L LE LSU with quad eccentric lowering from 8 inch box step, min UE assist for balance x 10 reps HEP    HEP: as noted above    Assessment: Pt has completed the 6 recommended PT sessions for his chronic L lateral ankle pain, weakness, decreased balance complaints. Progress has been steady with the exception of recent re injury while playing golf. That is quickly improving. He has restored full ankle and digit AROM, strength globally is  4+/5, performs 80% of single heel raise. Balance is decreased.     I recommend to continue PT for 3 more sessions over the course of the next 6-8 weeks (total 9) to further progress tx and HEP, prevent further re injuries, return to recreational activities safely. Danny agrees with this POC.    Please sign this POC if you agree so Danny continue tx. Thank you.          Goals: (to be met in 6 visits)  -Improve R ankle eversion strength to > 4/5 so pt can make directions changes w/o limitations from pain  Met  -Improve loading tolerance to the CF ligament so pt can perform recreational activities with little to no restrictions due to lateral ankle pain. In progress  -Improve R ST joint eversion ROM to WNL so pt ambulate 1 mile or > w/o limitations from pain Met  -Independent with progressive HEP    LEFS Score  LEFS Score: 80 % (5/7/2024 10:23 AM)    Plan: need POC signed to cont. .

## 2024-06-20 ENCOUNTER — APPOINTMENT (OUTPATIENT)
Dept: PHYSICAL THERAPY | Age: 69
End: 2024-06-20
Attending: PODIATRIST
Payer: MEDICARE

## 2024-06-24 ENCOUNTER — APPOINTMENT (OUTPATIENT)
Dept: PHYSICAL THERAPY | Age: 69
End: 2024-06-24
Attending: PODIATRIST
Payer: MEDICARE

## 2024-07-11 ENCOUNTER — APPOINTMENT (OUTPATIENT)
Dept: PHYSICAL THERAPY | Age: 69
End: 2024-07-11
Attending: PODIATRIST
Payer: MEDICARE

## 2024-07-16 RX ORDER — AMITRIPTYLINE HYDROCHLORIDE 25 MG/1
25 TABLET, FILM COATED ORAL NIGHTLY
Qty: 30 TABLET | Refills: 5 | Status: SHIPPED | OUTPATIENT
Start: 2024-07-16

## 2024-07-16 NOTE — TELEPHONE ENCOUNTER
Medication: AMITRIPTYLINE 25 MG Oral Tab      Date of last refill: 04/08/2024 (#30/2)  [place]     Last office visit: 04/08/2024  Due back to clinic per last office note:  6 months   Date next office visit scheduled:    Future Appointments   Date Time Provider Department Center   9/20/2024 11:00 AM Aisha Healy MD [facility]   10/9/2024 10:40 AM Shabnam Schultz MD [facility] [place]           Last OV note recommendation:        ICD-10-CM     1. New persistent daily headache  G44.52                     New daily persistent headaches probable tension headache or combination stress and medication rebound headache from Tylenol PM use     CT head negative for acute intracranial abnormality  ESR normal     Continue Amitriptyline for insomnia and headache prevention- he can try half tab 12.5 mg or 10 mg nightly. If he gets same benefit as 25 mg he can continue lower dose     Follow up in about 6-9 months     Shabnam Schultz MD  [facility] [place]

## 2024-07-29 ENCOUNTER — PATIENT MESSAGE (OUTPATIENT)
Dept: NEUROLOGY | Facility: CLINIC | Age: 69
End: 2024-07-29

## 2024-07-29 RX ORDER — DICLOFENAC SODIUM 75 MG/1
75 TABLET, DELAYED RELEASE ORAL 2 TIMES DAILY PRN
Qty: 30 TABLET | Refills: 0 | Status: SHIPPED | OUTPATIENT
Start: 2024-07-29

## 2024-07-29 NOTE — TELEPHONE ENCOUNTER
Medication:Diclofenac 75     Date of last refill: 01/08/2024 (#30/0)  [place]     Last office visit: 04/08/2024  Due back to clinic per last office note:  6 months  Date next office visit scheduled:    Future Appointments   Date Time Provider Department Center   9/20/2024 11:00 AM Aisha Healy MD [facility]   10/9/2024 10:40 AM Shabnam Schultz MD [facility] [place]           Last OV note recommendation:    New daily persistent headaches probable tension headache or combination stress and medication rebound headache from Tylenol PM use     CT head negative for acute intracranial abnormality  ESR normal     Continue Amitriptyline for insomnia and headache prevention- he can try half tab 12.5 mg or 10 mg nightly. If he gets same benefit as 25 mg he can continue lower dose     Follow up in about 6-9 months

## 2024-07-29 NOTE — TELEPHONE ENCOUNTER
From: Danny Montenegro  To: Shabnam Schultz  Sent: 7/29/2024 12:33 PM CDT  Subject: Headaches have returned     Hello. The headaches for which I am being treated, and which had disappeared almost immediately with the use of 25 mg. amitriptyline, have suddenly returned the last 4 days.     When I was placed on the medication, I was also prescribed diclofenac as an additional pain reliever for, I’m guessing, something like I’m experiencing today.    The problem is, because I had almost a year pain free, and because I’m not one to hang on to medication I don’t use, I disposed of the diclofenac without even actually taking it.     So my question is , can I get the prescription refilled or do I need to come in to see Dr Schultz ahead of our scheduled appointment on October 9th?    Let me know. Thanks

## 2024-07-31 ENCOUNTER — OFFICE VISIT (OUTPATIENT)
Dept: NEUROLOGY | Facility: CLINIC | Age: 69
End: 2024-07-31
Payer: MEDICARE

## 2024-07-31 VITALS
OXYGEN SATURATION: 97 % | DIASTOLIC BLOOD PRESSURE: 76 MMHG | WEIGHT: 186 LBS | SYSTOLIC BLOOD PRESSURE: 110 MMHG | BODY MASS INDEX: 30 KG/M2 | HEART RATE: 94 BPM

## 2024-07-31 DIAGNOSIS — G44.52 NEW PERSISTENT DAILY HEADACHE: Primary | ICD-10-CM

## 2024-07-31 DIAGNOSIS — G44.209 TENSION HEADACHE: ICD-10-CM

## 2024-07-31 PROCEDURE — 99213 OFFICE O/P EST LOW 20 MIN: CPT | Performed by: OTHER

## 2024-07-31 NOTE — PROGRESS NOTES
Pt states his amitriptyline was helping  but now he is having a headache for several days in a row.

## 2024-07-31 NOTE — PATIENT INSTRUCTIONS
Refill policies:    Allow 2-3 business days for refills; controlled substances may take longer.  Contact your pharmacy at least 5 days prior to running out of medication and have them send an electronic request or submit request through the “request refill” option in your [employer] account.  Refills are not addressed on weekends; covering physicians do not authorize routine medications on weekends.  No narcotics or controlled substances are refilled after noon on Fridays or by on call physicians.  By law, narcotics must be electronically prescribed.  A 30 day supply with no refills is the maximum allowed.  If your prescription is due for a refill, you may be due for a follow up appointment.  To best provide you care, patients receiving routine medications need to be seen at least once a year.  Patients receiving narcotic/controlled substance medications need to be seen at least once every 3 months.  In the event that your preferred pharmacy does not have the requested medication in stock (e.g. Backordered), it is your responsibility to find another pharmacy that has the requested medication available.  We will gladly send a new prescription to that pharmacy at your request.    Scheduling Tests:    If your physician has ordered radiology tests such as MRI or CT scans, please contact Central Scheduling at [phone] right away to schedule the test.  Once scheduled, the [facility] Centralized Referral Team will work with your insurance carrier to obtain pre-certification or prior authorization.  Depending on your insurance carrier, approval may take 3-10 days.  It is highly recommended patients assure they have received an authorization before having a test performed.  If test is done without insurance authorization, patient may be responsible for the entire amount billed.      Precertification and Prior Authorizations:  If your physician has recommended that you have a procedure or additional testing performed the [facility]  Centralized Referral Team will contact your insurance carrier to obtain pre-certification or prior authorization.    You are strongly encouraged to contact your insurance carrier to verify that your procedure/test has been approved and is a COVERED benefit.  Although the [facility] Centralized Referral Team does its due diligence, the insurance carrier gives the disclaimer that \"Although the procedure is authorized, this does not guarantee payment.\"    Ultimately the patient is responsible for payment.   Thank you for your understanding in this matter.  Paperwork Completion:  If you require FMLA or disability paperwork for your recovery, please make sure to either drop it off or have it faxed to our office at [phone]. Be sure the form has your name and date of birth on it.  The form will be faxed to our Forms Department and they will complete it for you.  There is a 25$ fee for all forms that need to be filled out.  Please be aware there is a 10-14 day turnaround time.  You will need to sign a release of information (NIGHAT) form if your paperwork does not come with one.  You may call the Forms Department with any questions at [phone].  Their fax number is [phone].
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
## 2024-08-01 ENCOUNTER — APPOINTMENT (OUTPATIENT)
Dept: PHYSICAL THERAPY | Age: 69
End: 2024-08-01
Attending: PODIATRIST
Payer: MEDICARE

## 2024-08-13 ENCOUNTER — HOSPITAL ENCOUNTER (OUTPATIENT)
Dept: CT IMAGING | Facility: HOSPITAL | Age: 69
Discharge: HOME | End: 2024-08-13
Attending: Other
Payer: MEDICARE

## 2024-08-13 DIAGNOSIS — G44.52 NEW PERSISTENT DAILY HEADACHE: ICD-10-CM

## 2024-08-13 PROCEDURE — 82565 ASSAY OF CREATININE: CPT

## 2024-08-13 PROCEDURE — 70496 CT ANGIOGRAPHY HEAD: CPT | Performed by: OTHER

## 2024-08-14 LAB
CREAT BLD-MCNC: 1 MG/DL
EGFRCR SERPLBLD CKD-EPI 2021: 81 ML/MIN/1.73M2 (ref 60–?)

## 2024-09-04 ENCOUNTER — OFFICE VISIT (OUTPATIENT)
Dept: FAMILY MEDICINE CLINIC | Facility: CLINIC | Age: 69
End: 2024-09-04
Payer: MEDICARE

## 2024-09-04 DIAGNOSIS — Z13.29 SCREENING FOR THYROID DISORDER: ICD-10-CM

## 2024-09-04 DIAGNOSIS — Z12.5 SCREENING FOR PROSTATE CANCER: ICD-10-CM

## 2024-09-04 DIAGNOSIS — R35.1 NOCTURIA: ICD-10-CM

## 2024-09-04 DIAGNOSIS — Z13.6 SCREENING FOR CARDIOVASCULAR CONDITION: ICD-10-CM

## 2024-09-04 DIAGNOSIS — Z00.00 ENCOUNTER FOR ANNUAL HEALTH EXAMINATION: Primary | ICD-10-CM

## 2024-09-04 DIAGNOSIS — Z13.0 SCREENING FOR DEFICIENCY ANEMIA: ICD-10-CM

## 2024-09-04 DIAGNOSIS — I10 ESSENTIAL HYPERTENSION, BENIGN: ICD-10-CM

## 2024-09-04 PROCEDURE — G0439 PPPS, SUBSEQ VISIT: HCPCS | Performed by: FAMILY MEDICINE

## 2024-09-04 NOTE — PROGRESS NOTES
Assessment and Plan:  Danny Montenegro is a 69 year old male who presents for a Medicare Assessment.   Danny was seen today for physical.    Diagnoses and all orders for this visit:    Encounter for annual health examination    Screening for prostate cancer  -     PSA, TOTAL W REFLEX TO PSA, FREE [[id number]][Q]    Essential hypertension, benign controlled  -     losartan 25 MG Oral Tab; Take 1 tablet (25 mg total) by mouth daily.  -     CMP  -      continue the same dose of medication  -      blood pressure is still slightly higher than 130/80  -      DASH diet, limit salt to less than 1000 to 2000 mg  -      Goal BP less than 130/80    Screening for cardiovascular condition  -     Lipid Panel    Screening for deficiency anemia  -     CBC    Screening for thyroid disorder  -     TSH W Reflex To Free T4    Age appropriate anticipatory guidance reviewed  Health Maintenance   Topic Date Due    COVID-19 Vaccine (7 - 2023-24 season) 09/01/2024    PSA  09/28/2024    Influenza Vaccine (1) 10/01/2024    Annual Physical  Done today    Colorectal Cancer Screening  05/09/2027    Annual Depression Screening  Completed    Fall Risk Screening (Annual)  Completed    Pneumococcal Vaccine: 65+ Years  Completed    Zoster Vaccines  Completed       The patient indicates understanding of these issues and agrees to the plan.  Reinforced healthy diet, lifestyle, and exercise.      Return in 6 months (on 3/4/2025).     Aisha Healy MD, 9/4/2024          Subjective:   Danny Montenegro is a 69 year old male who presents for a Medicare Subsequent Annual Wellness visit (Pt already had Initial Annual Wellness) and scheduled follow up of multiple significant but stable problems.     Migraine headache has been mostly well controlled, triggered by weather change. Taking amitriptyline and it helped.    Compliant with blood pressure medication, tolerating it well, states sometimes will feel lightheaded if he gets up too quickly, BP has been  in the range of 120/80's.    History/Other:   Fall Risk Assessment:   He has been screened for Falls and is low risk.      Cognitive Assessment:   He had a completely normal cognitive assessment - see flowsheet entries     Functional Ability/Status:   Danny Montenegro has some abnormal functions as listed below:  He has Dressing and/or Bathing issues based on screening of functional status.  Difficulty dressing or bathing?: No  Bathing or Showering: Cannot do without help  Dressing: Cannot do without help  He has Toileting difficulties based on screening of functional status.He has Eating difficulties based on screening of functional status.He has Vision problems based on screening of functional status.       Depression Screening (PHQ):  PHQ-2 SCORE: 0  , done 9/1/2024             Advanced Directives:   He has a Living Will on file in [facility]; reviewed and discussed documents with patient (and family/surrogate if present).  He has a Power of  for Health Care on file in [facility].  Discussed Advance Care Planning with patient (and family/surrogate if present). Standard forms made available to patient in After Visit Summary.      Patient Active Problem List   Diagnosis    Cervical spondylosis without myelopathy    Spinal stenosis in cervical region    Heart murmur    Personal history of colonic polyps    Benign neoplasm of transverse colon    Essential hypertension, benign    History of adenomatous polyp of colon     Allergies:  He is allergic to penicillins.    Current Medications:  Outpatient Medications Marked as Taking for the 9/4/24 encounter (Office Visit) with Aisha Healy MD   Medication Sig    losartan 25 MG Oral Tab Take 1 tablet (25 mg total) by mouth daily.    AMITRIPTYLINE 25 MG Oral Tab TAKE 1 TABLET(25 MG) BY MOUTH EVERY NIGHT    latanoprost 0.005 % Ophthalmic Solution Place 0.005 drops into the right eye nightly.       Medical History:  He  has a past medical history of Allergic rhinitis  (2000), Arthritis (?), Back pain (), Chronic right shoulder pain, Depression, Essential hypertension, High blood pressure, History of depression (), Insomnia, Sleep disturbance (), Stress (), and Wears glasses ().  Surgical History:  He  has a past surgical history that includes appendectomy (); other surgical history (age 39); other surgical history (AGE 51); other surgical history (3/17/2011 [facility]); other surgical history (2011 [facility]); other surgical history (N/A, 2016); colonoscopy (2016); and vasectomy ().   Family History:  His family history includes Breast Cancer in his sister; Cancer in his maternal cousin female, sister, and sister; Cancer (age of onset: 51) in his mother; Cancer (age of onset: 67) in his father; Heart Attack in his brother; Heart Disorder in his brother and brother; Prostate Cancer in his maternal grandfather.  Social History:  He  reports that he quit smoking about 44 years ago. His smoking use included cigarettes. He started smoking about 51 years ago. He has a 3.5 pack-year smoking history. He has never been exposed to tobacco smoke. He has never used smokeless tobacco. He reports current alcohol use. He reports current drug use. Frequency: 1.00 time per week. Drug: Cannabis.    Tobacco:  He smoked tobacco in the past but quit greater than 12 months ago.  Social History     Tobacco Use   Smoking Status Former    Current packs/day: 0.00    Average packs/day: 0.5 packs/day for 7.0 years (3.5 ttl pk-yrs)    Types: Cigarettes    Start date: 1973    Quit date: 1980    Years since quittin.7    Passive exposure: Never   Smokeless Tobacco Never   Tobacco Comments    quit in           CAGE Alcohol Screen:   CAGE screening score of 0 on 2024, showing low risk of alcohol abuse.      Patient Care Team:  Aisha Healy MD as PCP - General (Family Medicine)  Sharon Aviles PT as Physical Therapist  Shabnam Schultz  MD as Consulting Physician (NEUROLOGY)  Azam Washburn, PT as Physical Therapist    Review of Systems   Constitutional:  Negative for appetite change, fatigue, fever and unexpected weight change.   HENT:  Negative for congestion, ear pain, hearing loss and sore throat.    Eyes:  Negative for discharge, redness and visual disturbance.   Respiratory:  Negative for cough, chest tightness and shortness of breath.    Cardiovascular:  Negative for chest pain and palpitations.   Gastrointestinal:  Negative for abdominal pain, blood in stool, constipation and nausea.   Endocrine: Negative for cold intolerance, heat intolerance and polyuria.   Genitourinary:  Negative for difficulty urinating, dysuria, frequency and urgency.   Musculoskeletal:  Negative for arthralgias, gait problem, joint swelling and myalgias.   Skin:  Negative for rash.   Allergic/Immunologic: Negative for food allergies.   Neurological:  Negative for dizziness, weakness, numbness and headaches.   Hematological:  Negative for adenopathy. Does not bruise/bleed easily.   Psychiatric/Behavioral:  Negative for dysphoric mood and sleep disturbance. The patient is not nervous/anxious.          Objective:   Physical Exam  Constitutional:       General: He is not in acute distress.     Appearance: Normal appearance. He is well-developed and normal weight.   HENT:      Head: Normocephalic and atraumatic.      Right Ear: Tympanic membrane, ear canal and external ear normal.      Left Ear: Tympanic membrane, ear canal and external ear normal.      Nose: Nose normal.      Mouth/Throat:      Mouth: Mucous membranes are moist.      Pharynx: Oropharynx is clear.   Eyes:      Extraocular Movements: Extraocular movements intact.      Conjunctiva/sclera: Conjunctivae normal.      Pupils: Pupils are equal, round, and reactive to light.   Neck:      Thyroid: No thyromegaly.   Cardiovascular:      Rate and Rhythm: Normal rate and regular rhythm.      Pulses: Normal  pulses.      Heart sounds: Normal heart sounds. No murmur heard.  Pulmonary:      Effort: Pulmonary effort is normal. No respiratory distress.      Breath sounds: Normal breath sounds.   Chest:      Chest wall: No tenderness.   Abdominal:      General: Bowel sounds are normal. There is no distension.      Palpations: Abdomen is soft. There is no hepatomegaly, splenomegaly or mass.      Tenderness: There is no abdominal tenderness.      Hernia: No hernia is present.   Musculoskeletal:         General: Normal range of motion.      Cervical back: Normal range of motion and neck supple.      Right lower leg: No edema.      Left lower leg: No edema.   Lymphadenopathy:      Cervical: No cervical adenopathy.   Skin:     General: Skin is warm.      Findings: No rash.   Neurological:      General: No focal deficit present.      Mental Status: He is alert and oriented to person, place, and time.      Cranial Nerves: No cranial nerve deficit.      Sensory: No sensory deficit.      Motor: No weakness.      Coordination: Coordination normal.      Gait: Gait normal.      Deep Tendon Reflexes: Reflexes are normal and symmetric. Reflexes normal.   Psychiatric:         Mood and Affect: Mood normal.         Behavior: Behavior normal.         Thought Content: Thought content normal.         Judgment: Judgment normal.         /84   Pulse 98   Temp 97.9 °F (36.6 °C) (Temporal)   Resp 18   Ht 5' 5\" (1.651 m)   Wt 184 lb (83.5 kg)   SpO2 98%   BMI 30.62 kg/m²  Estimated body mass index is 30.62 kg/m² as calculated from the following:    Height as of this encounter: 5' 5\" (1.651 m).    Weight as of this encounter: 184 lb (83.5 kg).    Medicare Hearing Assessment:   Hearing Screening    Screening Method: Whisper Test  Whisper Test Result: Pass         Visual Acuity:   Right Eye Visual Acuity: Corrected Right Eye Chart Acuity: 20/30   Left Eye Visual Acuity: Corrected Left Eye Chart Acuity: 20/30   Both Eyes Visual Acuity:  Corrected Both Eyes Chart Acuity: 20/30   Able To Tolerate Visual Acuity: Yes          Supplementary Documentation:   General Health:  In the past six months, have you lost more than 10 pounds without trying?: 2 - No  Has your appetite been poor?: No  Type of Diet: Balanced  How does the patient maintain a good energy level?: Appropriate Exercise;Other  How would you describe your daily physical activity?: Moderate  How would you describe your current health state?: Good  How do you maintain positive mental well-being?: Social Interaction;Games;Visiting Friends  On a scale of 0 to 10, with 0 being no pain and 10 being severe pain, what is your pain level?: 2 - (Mild)  In the past six months, have you experienced urine leakage?: 0-No  At any time do you feel concerned for the safety/well-being of yourself and/or your children, in your home or elsewhere?: No  Have you had any immunizations at another office such as Influenza, Hepatitis B, Tetanus, or Pneumococcal?: No    Health Maintenance   Topic Date Due    COVID-19 Vaccine (7 - 2023-24 season) 09/01/2024    Annual Physical  09/15/2024    PSA  09/28/2024    Influenza Vaccine (1) 10/01/2024    Colorectal Cancer Screening  05/09/2027    Annual Depression Screening  Completed    Fall Risk Screening (Annual)  Completed    Pneumococcal Vaccine: 65+ Years  Completed    Zoster Vaccines  Completed     Aisha Healy M.D.

## 2024-09-05 VITALS
WEIGHT: 184 LBS | SYSTOLIC BLOOD PRESSURE: 136 MMHG | RESPIRATION RATE: 18 BRPM | BODY MASS INDEX: 30.66 KG/M2 | HEIGHT: 65 IN | DIASTOLIC BLOOD PRESSURE: 84 MMHG | OXYGEN SATURATION: 98 % | TEMPERATURE: 98 F | HEART RATE: 98 BPM

## 2024-09-05 RX ORDER — LOSARTAN POTASSIUM 25 MG/1
25 TABLET ORAL DAILY
Qty: 90 TABLET | Refills: 3 | Status: SHIPPED | OUTPATIENT
Start: 2024-09-05

## 2024-09-20 LAB
ABSOLUTE BASOPHILS: 32 CELLS/UL (ref 0–200)
ABSOLUTE EOSINOPHILS: 218 CELLS/UL (ref 15–500)
ABSOLUTE LYMPHOCYTES: 2016 CELLS/UL (ref 850–3900)
ABSOLUTE MONOCYTES: 352 CELLS/UL (ref 200–950)
ABSOLUTE NEUTROPHILS: 3782 CELLS/UL (ref 1500–7800)
ALBUMIN/GLOBULIN RATIO: 2 (CALC) (ref 1–2.5)
ALBUMIN: 4.4 G/DL (ref 3.6–5.1)
ALKALINE PHOSPHATASE: 66 U/L (ref 35–144)
ALT: 17 U/L (ref 9–46)
AST: 19 U/L (ref 10–35)
BASOPHILS: 0.5 %
BILIRUBIN, TOTAL: 0.6 MG/DL (ref 0.2–1.2)
BUN: 19 MG/DL (ref 7–25)
CALCIUM: 9.7 MG/DL (ref 8.6–10.3)
CARBON DIOXIDE: 27 MMOL/L (ref 20–32)
CHLORIDE: 104 MMOL/L (ref 98–110)
CHOL/HDLC RATIO: 2.3 (CALC)
CHOLESTEROL, TOTAL: 132 MG/DL
CREATININE: 0.9 MG/DL (ref 0.7–1.28)
EGFR: 92 ML/MIN/1.73M2
EOSINOPHILS: 3.4 %
GLOBULIN: 2.2 G/DL (CALC) (ref 1.9–3.7)
GLUCOSE: 89 MG/DL (ref 65–99)
HDL CHOLESTEROL: 58 MG/DL
HEMATOCRIT: 44.3 % (ref 38.5–50)
HEMOGLOBIN: 14.4 G/DL (ref 13.2–17.1)
LDL-CHOLESTEROL: 60 MG/DL (CALC)
LYMPHOCYTES: 31.5 %
MCH: 30.4 PG (ref 27–33)
MCHC: 32.5 G/DL (ref 32–36)
MCV: 93.5 FL (ref 80–100)
MONOCYTES: 5.5 %
MPV: 9.4 FL (ref 7.5–12.5)
NEUTROPHILS: 59.1 %
NON-HDL CHOLESTEROL: 74 MG/DL (CALC)
PLATELET COUNT: 255 THOUSAND/UL (ref 140–400)
POTASSIUM: 4.7 MMOL/L (ref 3.5–5.3)
PROTEIN, TOTAL: 6.6 G/DL (ref 6.1–8.1)
RDW: 12.7 % (ref 11–15)
RED BLOOD CELL COUNT: 4.74 MILLION/UL (ref 4.2–5.8)
SODIUM: 139 MMOL/L (ref 135–146)
TOTAL PSA: 3.2 NG/ML
TRIGLYCERIDES: 49 MG/DL
TSH W/REFLEX TO FT4: 1.09 MIU/L (ref 0.4–4.5)
WHITE BLOOD CELL COUNT: 6.4 THOUSAND/UL (ref 3.8–10.8)

## 2024-10-09 ENCOUNTER — OFFICE VISIT (OUTPATIENT)
Dept: NEUROLOGY | Facility: CLINIC | Age: 69
End: 2024-10-09
Payer: MEDICARE

## 2024-10-09 VITALS
BODY MASS INDEX: 32 KG/M2 | WEIGHT: 191 LBS | SYSTOLIC BLOOD PRESSURE: 124 MMHG | DIASTOLIC BLOOD PRESSURE: 74 MMHG | HEART RATE: 84 BPM | RESPIRATION RATE: 16 BRPM

## 2024-10-09 DIAGNOSIS — G44.52 NEW PERSISTENT DAILY HEADACHE: ICD-10-CM

## 2024-10-09 DIAGNOSIS — G44.209 TENSION HEADACHE: ICD-10-CM

## 2024-10-09 PROCEDURE — 99213 OFFICE O/P EST LOW 20 MIN: CPT | Performed by: OTHER
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
## 2024-10-09 NOTE — PATIENT INSTRUCTIONS
Refill policies:    Allow 2-3 business days for refills; controlled substances may take longer.  Contact your pharmacy at least 5 days prior to running out of medication and have them send an electronic request or submit request through the “request refill” option in your [employer] account.  Refills are not addressed on weekends; covering physicians do not authorize routine medications on weekends.  No narcotics or controlled substances are refilled after noon on Fridays or by on call physicians.  By law, narcotics must be electronically prescribed.  A 30 day supply with no refills is the maximum allowed.  If your prescription is due for a refill, you may be due for a follow up appointment.  To best provide you care, patients receiving routine medications need to be seen at least once a year.  Patients receiving narcotic/controlled substance medications need to be seen at least once every 3 months.  In the event that your preferred pharmacy does not have the requested medication in stock (e.g. Backordered), it is your responsibility to find another pharmacy that has the requested medication available.  We will gladly send a new prescription to that pharmacy at your request.    Scheduling Tests:    If your physician has ordered radiology tests such as MRI or CT scans, please contact Central Scheduling at [phone] right away to schedule the test.  Once scheduled, the [facility] Centralized Referral Team will work with your insurance carrier to obtain pre-certification or prior authorization.  Depending on your insurance carrier, approval may take 3-10 days.  It is highly recommended patients assure they have received an authorization before having a test performed.  If test is done without insurance authorization, patient may be responsible for the entire amount billed.      Precertification and Prior Authorizations:  If your physician has recommended that you have a procedure or additional testing performed the [facility]  Centralized Referral Team will contact your insurance carrier to obtain pre-certification or prior authorization.    You are strongly encouraged to contact your insurance carrier to verify that your procedure/test has been approved and is a COVERED benefit.  Although the [facility] Centralized Referral Team does its due diligence, the insurance carrier gives the disclaimer that \"Although the procedure is authorized, this does not guarantee payment.\"    Ultimately the patient is responsible for payment.   Thank you for your understanding in this matter.  Paperwork Completion:  If you require FMLA or disability paperwork for your recovery, please make sure to either drop it off or have it faxed to our office at [phone]. Be sure the form has your name and date of birth on it.  The form will be faxed to our Forms Department and they will complete it for you.  There is a 25$ fee for all forms that need to be filled out.  Please be aware there is a 10-14 day turnaround time.  You will need to sign a release of information (NIGHAT) form if your paperwork does not come with one.  You may call the Forms Department with any questions at [phone].  Their fax number is [phone].

## 2025-01-06 NOTE — TELEPHONE ENCOUNTER
Medication: amitriptyline 25 mg     Date of last refill: 07/16/2024 (#30/5)  [place]     Last office visit: 10/09/2024  Due back to clinic per last office note:  not indicated  Date next office visit scheduled:    No future appointments.        Last OV note recommendation:    New daily persistent headaches probable tension headache or unspecified  CTA head unremarkable. CT head negative for acute intracranial abnormality  ESR normal     Continue amitriptyline 25 mg nightly for both headache prevention insomnia     Follow-up with PCP for the known right occipital scalp lipoma

## 2025-07-07 DIAGNOSIS — G44.209 TENSION HEADACHE: Primary | ICD-10-CM

## 2025-07-07 NOTE — TELEPHONE ENCOUNTER
Medication: AMITRIPTYLINE 25 MG Oral Tab      Date of last refill: 01/07/2025 (#90/1)  [place]A     Last office visit: 10/09/2024  Due back to clinic per last office note:  Not stated  Date next office visit scheduled:    Future Appointments   Date Time Provider Department Center   9/5/2025 10:40 AM Aisha Healy MD [facility]           Last OV note recommendation:    ASSESSMENT/PLAN:          ICD-10-CM     1. New persistent daily headache  G44.52 CTA BRAIN (CPT=70496)       2. Tension headache  G44.209                     New daily persistent headaches probable tension headache or unspecified  CTA head unremarkable. CT head negative for acute intracranial abnormality  ESR normal     Continue amitriptyline 25 mg nightly for both headache prevention insomnia     Follow-up with PCP for the known right occipital scalp lipoma     Shabnam Schultz MD  [facility]  This note was prepared using Dragon Medical voice recognition dictation software. As a result errors may occur. When identified these errors have been corrected. While every attempt is made to correct errors during dictation discrepancies may still exist

## 2025-07-10 ENCOUNTER — PATIENT MESSAGE (OUTPATIENT)
Dept: NEUROLOGY | Facility: CLINIC | Age: 70
End: 2025-07-10

## 2025-08-26 ENCOUNTER — PATIENT MESSAGE (OUTPATIENT)
Dept: FAMILY MEDICINE CLINIC | Facility: CLINIC | Age: 70
End: 2025-08-26

## 2025-08-26 DIAGNOSIS — Z13.0 SCREENING FOR DEFICIENCY ANEMIA: ICD-10-CM

## 2025-08-26 DIAGNOSIS — Z13.6 SCREENING FOR CARDIOVASCULAR CONDITION: ICD-10-CM

## 2025-08-26 DIAGNOSIS — R35.1 NOCTURIA: ICD-10-CM

## 2025-08-26 DIAGNOSIS — Z13.29 SCREENING FOR THYROID DISORDER: ICD-10-CM

## 2025-08-26 DIAGNOSIS — Z12.5 SCREENING FOR PROSTATE CANCER: Primary | ICD-10-CM

## 2025-08-26 DIAGNOSIS — I10 ESSENTIAL HYPERTENSION, BENIGN: ICD-10-CM

## (undated) DIAGNOSIS — I10 ESSENTIAL (PRIMARY) HYPERTENSION: ICD-10-CM

## (undated) NOTE — MR AVS SNAPSHOT
[address]  [phone]               Thank you for choosing us for your health care visit with David Perez PT. We are glad to serve you and happy to provide you with this summary of your visit.   Please he the building. For security purposes, please check in with the reception staff at every visit.                                           Apr 28, 2017  7:00 AM   PT VISIT BY THERAPIST with Martín Gagnon PT   THE [facility] OF [facility] Physical Therapy in [facility] (EDW Se inside the [employer], at [facility] (enter via [facility]). Convenient parking is available in the parking lot in front of the [employer].   When you enter the [employer], please check in with the

## (undated) NOTE — MR AVS SNAPSHOT
[address] [phone]               Thank you for choosing us for your health care visit with David Perez PT. We are glad to serve you and happy to provide you with this summary of your visit.   Please he the building. For security purposes, please check in with the reception staff at every visit.                                             Instructions and Information about Your Health     None      Allergies as of May 26, 2017     Penicillins Anaphylaxis Tips for increasing your physical activity – Adults who are physically active are less likely to develop some chronic diseases than adults who are inactive.      HOW TO GET STARTED: HOW TO STAY MOTIVATED:   Start activities slowly and build up over time Do

## (undated) NOTE — MR AVS SNAPSHOT
[address] [phone]               Thank you for choosing us for your health care visit with David Perez PT. We are glad to serve you and happy to provide you with this summary of your visit.   Please he the building. For security purposes, please check in with the reception staff at every visit.                                           May 26, 2017  7:00 AM   PT VISIT BY THERAPIST with ARIANNA Kemp Physical Therapy in [place] (EDW Se inside the [employer], at [facility] (enter via [facility]). Convenient parking is available in the parking lot in front of the [employer].   When you enter the [employer], please check in with the

## (undated) NOTE — Clinical Note
Patient Name: Raymond Haywood  YOB: 1955          MRN number:  [id number]  Date:  5/26/2017  Referring Physician:  Ryder Staff    Discharge Summary    Dear Dr. Natasha Casey has completed 5 sessions in physical therapy for R scap for this course of care.     Thank you for your referral. If you have any questions, please contact me at Dept: [phone]    Sincerely,  Electronically signed by therapist: Susie Victoria

## (undated) NOTE — ED AVS SNAPSHOT
Anson Hollis   MRN: [id number]    Department:  [facility] Emergency Department   Date of Visit:  3/1/2020           Disclosure     Insurance plans vary and the physician(s) referred by the ER may not be covered by your plan.  Please contact y tell this physician (or your personal doctor if your instructions are to return to your personal doctor) about any new or lasting problems. The primary care or specialist physician will see patients referred from the [facility] Emergency Department.  Stephanie Machuca

## (undated) NOTE — Clinical Note
Patient Name: Marcia Marin  YOB: 1955          MRN number:  [id number]  Date:  4/7/2017  Referring Physician:  Meena Coyle         UPPER EXTREMITY EVALUATION:    Referring Physician: Dr. Jing Francois  Diagnosis: Chronic right shoulder Flexion: R 160; L 170  Abduction: R 160; L 170  ER: R 80; L 90  IR: R 70;  L 75       Accessory motion: Hypomobility cervical and thoracic spine    Flexibility: Tightness of upper cervical muscles    Strength/MMT:  Shoulder Scapular   Flexion: R 5/5; L 5/5

## (undated) NOTE — MR AVS SNAPSHOT
[address]  [phone]               Thank you for choosing us for your health care visit with David Perez PT. We are glad to serve you and happy to provide you with this summary of your visit.   Please he the building. For security purposes, please check in with the reception staff at every visit.                                           May 05, 2017  7:00 AM   PT VISIT BY THERAPIST with Venus Zaragoza PT   THE [facility] Physical Therapy in [facility] (EDW Se inside the [employer], at [facility] (enter via [facility]). Convenient parking is available in the parking lot in front of the [employer].   When you enter the [employer], please check in with the

## (undated) NOTE — MR AVS SNAPSHOT
[address] [place] [id number] [phone]               Thank you for choosing us for your health care visit with Moise Murdock MD.  We are glad to serve you and happy to provide you with this · Ask your doctor about the DASH eating plan. This plan helps reduce blood pressure. · When you go to a restaurant, ask that your meal be prepared with no added salt.   Maintain a healthy weight  · Ask your healthcare provider how many calories to eat a da © 3169-2425 [address], [address]. All rights reserved. This information is not intended as a substitute for professional medical care. Always follow your healthcare professional's instructions.         Roseanna Stress, anxiety, and body tension may keep you awake at night. To unwind before bedtime, try a warm bath, meditation, or yoga. Also, try the following:  · Deep breathing. Sit or lie back in a chair. Take a slow, deep breath. Hold it for 5 counts.  Then sabrina Return in about 3 months (around 4/23/2017).          Referral Orders      Normal Orders This Visit    OP REFERRAL TO [facility] PHYSICAL THERAPY & Keli Mathew [[id number] [facility]]  Order #:  [id number]         **THIS IS NOT A REFERRAL**  Your physician has recommended Summaries. If you've been to the Emergency Department or your doctor's office, you can view your past visit information in [employer] by going to Visits < Visit Summaries. [employer] questions? Call [phone] for help.   [employer] is NOT to be used for urge

## (undated) NOTE — MR AVS SNAPSHOT
[address]  [phone]               Thank you for choosing us for your health care visit with David Perez PT. We are glad to serve you and happy to provide you with this summary of your visit.   Please he the building. For security purposes, please check in with the reception staff at every visit.                                           Jun 02, 2017  7:00 AM   PT VISIT BY THERAPIST with Minor ARIANNA Franco   THE [facility] OF [facility] Physical Therapy in [facility] (EDW Se TYLENOL PM EXTRA STRENGTH  MG Tabs   Generic drug:  Diphenhydramine-APAP (sleep)   Take by mouth.                    MyChart     Visit MyChart  You can access your MyChart to more actively manage your health care and view more details from this visit